# Patient Record
Sex: FEMALE | Race: BLACK OR AFRICAN AMERICAN | Employment: OTHER | URBAN - METROPOLITAN AREA
[De-identification: names, ages, dates, MRNs, and addresses within clinical notes are randomized per-mention and may not be internally consistent; named-entity substitution may affect disease eponyms.]

---

## 2018-02-13 ENCOUNTER — OP HISTORICAL/CONVERTED ENCOUNTER (OUTPATIENT)
Dept: OTHER | Age: 78
End: 2018-02-13

## 2018-06-01 ENCOUNTER — OP HISTORICAL/CONVERTED ENCOUNTER (OUTPATIENT)
Dept: OTHER | Age: 78
End: 2018-06-01

## 2019-10-01 ENCOUNTER — OP HISTORICAL/CONVERTED ENCOUNTER (OUTPATIENT)
Dept: OTHER | Age: 79
End: 2019-10-01

## 2020-12-06 ENCOUNTER — HOSPITAL ENCOUNTER (EMERGENCY)
Age: 80
Discharge: HOME | End: 2020-12-06
Attending: EMERGENCY MEDICINE
Payer: MEDICARE

## 2020-12-06 ENCOUNTER — APPOINTMENT (OUTPATIENT)
Dept: GENERAL RADIOLOGY | Age: 80
End: 2020-12-06
Attending: EMERGENCY MEDICINE
Payer: MEDICARE

## 2020-12-06 VITALS
TEMPERATURE: 97.7 F | DIASTOLIC BLOOD PRESSURE: 67 MMHG | SYSTOLIC BLOOD PRESSURE: 132 MMHG | OXYGEN SATURATION: 97 % | RESPIRATION RATE: 16 BRPM | WEIGHT: 162 LBS | BODY MASS INDEX: 24.55 KG/M2 | HEIGHT: 68 IN | HEART RATE: 67 BPM

## 2020-12-06 DIAGNOSIS — R07.89 ATYPICAL CHEST PAIN: Primary | ICD-10-CM

## 2020-12-06 LAB
ANION GAP SERPL CALC-SCNC: 8 MMOL/L (ref 5–15)
BASOPHILS # BLD: 0 K/UL (ref 0–0.1)
BASOPHILS NFR BLD: 0 % (ref 0–1)
BUN SERPL-MCNC: 17 MG/DL (ref 6–20)
BUN/CREAT SERPL: 18 (ref 12–20)
CA-I BLD-MCNC: 9.6 MG/DL (ref 8.5–10.1)
CHLORIDE SERPL-SCNC: 100 MMOL/L (ref 97–108)
CO2 SERPL-SCNC: 28 MMOL/L (ref 21–32)
CREAT SERPL-MCNC: 0.92 MG/DL (ref 0.55–1.02)
DIFFERENTIAL METHOD BLD: ABNORMAL
EOSINOPHIL # BLD: 0.1 K/UL (ref 0–0.4)
EOSINOPHIL NFR BLD: 2 % (ref 0–7)
ERYTHROCYTE [DISTWIDTH] IN BLOOD BY AUTOMATED COUNT: 13.5 % (ref 11.5–14.5)
GLUCOSE SERPL-MCNC: 152 MG/DL (ref 65–100)
HCT VFR BLD AUTO: 39.9 % (ref 35–47)
HGB BLD-MCNC: 12.9 G/DL (ref 11.5–16)
IMM GRANULOCYTES # BLD AUTO: 0 K/UL (ref 0–0.04)
IMM GRANULOCYTES NFR BLD AUTO: 0 % (ref 0–0.5)
INR PPP: 1.1 (ref 0.9–1.1)
LYMPHOCYTES # BLD: 1.5 K/UL (ref 0.8–3.5)
LYMPHOCYTES NFR BLD: 24 % (ref 12–49)
MCH RBC QN AUTO: 30.8 PG (ref 26–34)
MCHC RBC AUTO-ENTMCNC: 32.3 G/DL (ref 30–36.5)
MCV RBC AUTO: 95.2 FL (ref 80–99)
MONOCYTES # BLD: 0.4 K/UL (ref 0–1)
MONOCYTES NFR BLD: 6 % (ref 5–13)
NEUTS SEG # BLD: 4.3 K/UL (ref 1.8–8)
NEUTS SEG NFR BLD: 68 % (ref 32–75)
PLATELET # BLD AUTO: 120 K/UL (ref 150–400)
PLATELET COMMENTS,PCOM: ABNORMAL
PMV BLD AUTO: 13.8 FL (ref 8.9–12.9)
POTASSIUM SERPL-SCNC: 3.8 MMOL/L (ref 3.5–5.1)
PROTHROMBIN TIME: 11 SEC (ref 9–11.1)
RBC # BLD AUTO: 4.19 M/UL (ref 3.8–5.2)
RBC MORPH BLD: ABNORMAL
SODIUM SERPL-SCNC: 136 MMOL/L (ref 136–145)
TROPONIN I SERPL-MCNC: <0.05 NG/ML
TROPONIN I SERPL-MCNC: <0.05 NG/ML
WBC # BLD AUTO: 6.3 K/UL (ref 3.6–11)

## 2020-12-06 PROCEDURE — 36415 COLL VENOUS BLD VENIPUNCTURE: CPT

## 2020-12-06 PROCEDURE — 85610 PROTHROMBIN TIME: CPT

## 2020-12-06 PROCEDURE — 99284 EMERGENCY DEPT VISIT MOD MDM: CPT

## 2020-12-06 PROCEDURE — 84484 ASSAY OF TROPONIN QUANT: CPT

## 2020-12-06 PROCEDURE — 80048 BASIC METABOLIC PNL TOTAL CA: CPT

## 2020-12-06 PROCEDURE — 71045 X-RAY EXAM CHEST 1 VIEW: CPT

## 2020-12-06 PROCEDURE — 93005 ELECTROCARDIOGRAM TRACING: CPT

## 2020-12-06 PROCEDURE — 85025 COMPLETE CBC W/AUTO DIFF WBC: CPT

## 2020-12-06 RX ORDER — GLUCOSAMINE SULFATE 1500 MG
1000 POWDER IN PACKET (EA) ORAL DAILY
COMMUNITY

## 2020-12-06 RX ORDER — NITROGLYCERIN 0.3 MG/1
0.4 TABLET SUBLINGUAL
COMMUNITY

## 2020-12-06 RX ORDER — AMLODIPINE BESYLATE 10 MG/1
10 TABLET ORAL DAILY
COMMUNITY

## 2020-12-06 RX ORDER — ISOSORBIDE MONONITRATE 20 MG/1
60 TABLET ORAL DAILY
COMMUNITY

## 2020-12-06 RX ORDER — ASPIRIN 81 MG/1
81 TABLET ORAL DAILY
COMMUNITY

## 2020-12-06 RX ORDER — CHOLECALCIFEROL (VITAMIN D3) 125 MCG
100 CAPSULE ORAL DAILY
COMMUNITY

## 2020-12-06 RX ORDER — CARVEDILOL 25 MG/1
25 TABLET ORAL 2 TIMES DAILY WITH MEALS
COMMUNITY

## 2020-12-06 RX ORDER — ATORVASTATIN CALCIUM 20 MG/1
20 TABLET, FILM COATED ORAL DAILY
COMMUNITY

## 2020-12-06 RX ORDER — CETIRIZINE HCL 10 MG
10 TABLET ORAL DAILY
COMMUNITY

## 2020-12-07 LAB
ATRIAL RATE: 68 BPM
CALCULATED P AXIS, ECG09: 73 DEGREES
CALCULATED R AXIS, ECG10: 53 DEGREES
CALCULATED T AXIS, ECG11: 36 DEGREES
DIAGNOSIS, 93000: NORMAL
P-R INTERVAL, ECG05: 184 MS
Q-T INTERVAL, ECG07: 446 MS
QRS DURATION, ECG06: 122 MS
QTC CALCULATION (BEZET), ECG08: 474 MS
VENTRICULAR RATE, ECG03: 68 BPM

## 2020-12-07 NOTE — ED PROVIDER NOTES
EMERGENCY DEPARTMENT HISTORY AND PHYSICAL EXAM      Date: 12/6/2020  Patient Name: Elías Henson    History of Presenting Illness     Chief Complaint   Patient presents with    Chest Pain       History Provided By: Patient    HPI: Elías Henson, 79 y.o. female   presents to the ED with cc of chest pain. Patient with history of MI and a pacemaker placement presents emergency room complaining of left sided chest pain for 2 days. Patient states that pain is localized under her left breast on the lateral chest.  The pain has been intermittent lasting \"1 second \"without obvious aggravating or alleviating factors and is described as sharp and stabbing without radiation. Patient states that this pain is distinctly different from the pain she had when she had  MI many years ago. Patient denies any associated symptoms with the chest pain -no palpitation, diaphoresis, shortness of breath, or syncope. PCP: Joan Wong MD    No current facility-administered medications on file prior to encounter. Current Outpatient Medications on File Prior to Encounter   Medication Sig Dispense Refill    aspirin delayed-release 81 mg tablet Take 81 mg by mouth daily.  isosorbide mononitrate (ISMO, MONOKET) 20 mg tablet Take 60 mg by mouth daily.  amLODIPine (NORVASC) 10 mg tablet Take 10 mg by mouth daily.  atorvastatin (LIPITOR) 20 mg tablet Take 20 mg by mouth daily.  cetirizine (ZYRTEC) 10 mg tablet Take 10 mg by mouth daily.  carvediloL (COREG) 25 mg tablet Take 25 mg by mouth two (2) times daily (with meals).  NUTRITIONAL SUPPLEMENTS PO Take 1 Cap by mouth daily. Grapine (High Potency)      NUTRITIONAL SUPPLEMENTS PO Take 2 Caps by mouth daily. Food Enzymes      NUTRITIONAL SUPPLEMENTS PO Take 2 Caps by mouth daily. I-X (organic Iron)      NUTRITIONAL SUPPLEMENTS PO Take 1 Cap by mouth daily. Yeast Fungal      Lactobacillus acidophilus (PROBIOTIC PO) Take 1 Cap by mouth daily.       co-enzyme Q-10 (CoQ-10) 100 mg capsule Take 100 mg by mouth daily.  COLLAGEN 1 Cap by Does Not Apply route daily.  lactose-reduced food (NUTRITIONAL SUPPLEMENT PO) Take 1 Cap by mouth daily. Everflex      HAWTHORN BERRY PO Take 2 Caps by mouth daily.  multivitamin with minerals (VANESSA MULTIPLE/CHELATED MINERAL PO) Take 1 Cap by mouth daily.  lactose-reduced food (NUTRITIONAL SUPPLEMENT PO) Take 1 Cap by mouth daily. Herbal Trace Mineral      lactose-reduced food (NUTRITIONAL SUPPLEMENT PO) Take 1 Cap by mouth daily. Nutri Calm (stress) (adrenals)      cholecalciferol (Vitamin D3) 25 mcg (1,000 unit) cap Take 1,000 Units by mouth daily.  nitroglycerin (Nitrostat) 0.3 mg SL tablet 0.4 mg by SubLINGual route every five (5) minutes as needed for Chest Pain.  calcium carb/vitamin D2/soyb (ONE-A-DAY BONE STRENGTH PO) Take 1 Cap by mouth three (3) times daily.  COLOSTRUM, BOVINE PO Take 2 Bottles by mouth nightly.  lactose-reduced food (NUTRITIONAL SUPPLEMENT PO) Take 1 Cap by mouth daily. Panothenic Acid         Past History     Past Medical History:  Past Medical History:   Diagnosis Date    Angina at rest [facility])     Environmental and seasonal allergies     High cholesterol     History of heart attack     Hypertension     Pacemaker        Past Surgical History:  Past Surgical History:   Procedure Laterality Date    HX PACEMAKER         Family History:  History reviewed. No pertinent family history. Social History:  Social History     Tobacco Use    Smoking status: Former Smoker    Smokeless tobacco: Never Used   Substance Use Topics    Alcohol use: Not Currently    Drug use: Not Currently       Allergies: Allergies   Allergen Reactions    Augmentin [Amoxicillin-Pot Clavulanate] Swelling    Codeine Nausea Only         Review of Systems   Review of Systems   Constitutional: Negative for activity change, appetite change, chills and fever.    HENT: Negative for sore throat. Eyes: Negative for discharge. Respiratory: Negative for shortness of breath. Cardiovascular: Negative for palpitations. Gastrointestinal: Negative for abdominal pain and nausea. Endocrine: Negative for polyuria. Genitourinary: Negative for difficulty urinating. Musculoskeletal: Negative for arthralgias. Skin: Negative for rash. Neurological: Negative for headaches. Hematological: Negative for adenopathy. Psychiatric/Behavioral: Negative for dysphoric mood. All other systems reviewed and are negative. Physical Exam   Physical Exam  Vitals signs and nursing note reviewed. Constitutional:       Appearance: Normal appearance. HENT:      Head: Normocephalic and atraumatic. Nose: Nose normal.      Mouth/Throat:      Mouth: Mucous membranes are moist.      Pharynx: Oropharynx is clear. Eyes:      Conjunctiva/sclera: Conjunctivae normal.   Neck:      Musculoskeletal: Neck supple. Thyroid: No thyromegaly. Vascular: No JVD. Cardiovascular:      Rate and Rhythm: Normal rate and regular rhythm. Heart sounds: Normal heart sounds. Comments: Left lateral chest wall mildly tender. No rash. Pulmonary:      Effort: Pulmonary effort is normal.      Breath sounds: Normal breath sounds. Abdominal:      General: Abdomen is flat. Bowel sounds are normal.      Palpations: Abdomen is soft. Musculoskeletal:      Right lower leg: No edema. Left lower leg: No edema. Skin:     General: Skin is warm and dry. Neurological:      General: No focal deficit present. Mental Status: She is alert and oriented to person, place, and time.    Psychiatric:         Mood and Affect: Mood normal.         Diagnostic Study Results     Labs -     Recent Results (from the past 12 hour(s))   CBC WITH AUTOMATED DIFF    Collection Time: 12/06/20  7:15 PM   Result Value Ref Range    WBC 6.3 3.6 - 11.0 K/uL    RBC 4.19 3.80 - 5.20 M/uL    HGB 12.9 11.5 - 16.0 g/dL    HCT 39.9 35.0 - 47.0 %    MCV 95.2 80.0 - 99.0 FL    MCH 30.8 26.0 - 34.0 PG    MCHC 32.3 30.0 - 36.5 g/dL    RDW 13.5 11.5 - 14.5 %    PLATELET 508 (L) 491 - 400 K/uL    MPV 13.8 (H) 8.9 - 12.9 FL    NEUTROPHILS 68 32 - 75 %    LYMPHOCYTES 24 12 - 49 %    MONOCYTES 6 5 - 13 %    EOSINOPHILS 2 0 - 7 %    BASOPHILS 0 0 - 1 %    IMMATURE GRANULOCYTES 0 0.0 - 0.5 %    ABS. NEUTROPHILS 4.3 1.8 - 8.0 K/UL    ABS. LYMPHOCYTES 1.5 0.8 - 3.5 K/UL    ABS. MONOCYTES 0.4 0.0 - 1.0 K/UL    ABS. EOSINOPHILS 0.1 0.0 - 0.4 K/UL    ABS. BASOPHILS 0.0 0.0 - 0.1 K/UL    ABS. IMM. GRANS. 0.0 0.00 - 0.04 K/UL    DF AUTOMATED      PLATELET COMMENTS Large Platelets      RBC COMMENTS Normocytic, Normochromic     PROTHROMBIN TIME + INR    Collection Time: 12/06/20  7:15 PM   Result Value Ref Range    Prothrombin time 11.0 9.0 - 11.1 sec    INR 1.1 0.9 - 1.1     METABOLIC PANEL, BASIC    Collection Time: 12/06/20  7:15 PM   Result Value Ref Range    Sodium 136 136 - 145 mmol/L    Potassium 3.8 3.5 - 5.1 mmol/L    Chloride 100 97 - 108 mmol/L    CO2 28 21 - 32 mmol/L    Anion gap 8 5 - 15 mmol/L    Glucose 152 (H) 65 - 100 mg/dL    BUN 17 6 - 20 mg/dL    Creatinine 0.92 0.55 - 1.02 mg/dL    BUN/Creatinine ratio 18 12 - 20      GFR est AA >60 >60 ml/min/1.73m2    GFR est non-AA 59 (L) >60 ml/min/1.73m2    Calcium 9.6 8.5 - 10.1 mg/dL   TROPONIN I    Collection Time: 12/06/20  7:15 PM   Result Value Ref Range    Troponin-I, Qt. <0.05 <0.05 ng/mL   TROPONIN I    Collection Time: 12/06/20 10:12 PM   Result Value Ref Range    Troponin-I, Qt. <0.05 <0.05 ng/mL       Radiologic Studies -   XR CHEST PORT   Final Result   IMPRESSION: No acute cardiopulmonary abnormality. CT Results  (Last 48 hours)    None        CXR Results  (Last 48 hours)               12/06/20 1928  XR CHEST PORT Final result    Impression:  IMPRESSION: No acute cardiopulmonary abnormality. Narrative:  EXAMINATION: XR CHEST PORT       HISTORY: Chest pain.        COMPARISON: 3/24/2013       FINDINGS: Single view. There is been interval placement of a left subclavian   pacemaker with intact leads overlying the right atrium and right ventricle. Multiple cardiac monitoring leads overlie the patient. The lungs are clear. There is no pneumothorax or pleural effusion. Heart size and mediastinal   contours are unchanged. Medical Decision Making   I am the first provider for this patient. I reviewed the vital signs, available nursing notes, past medical history, past surgical history, family history and social history. Vital Signs-Reviewed the patient's vital signs. Patient Vitals for the past 12 hrs:   Temp Pulse Resp BP SpO2   12/06/20 2018 -- 67 18 (!) 140/71 99 %   12/06/20 1915 97.7 °F (36.5 °C) 68 18 (!) 175/81 100 %       Records Reviewed:     Provider Notes (Medical Decision Making):       ED Course:   Initial assessment performed. The patients presenting problems have been discussed, and they are in agreement with the care plan formulated and outlined with them. I have encouraged them to ask questions as they arise throughout their visit. ED Course as of Dec 06 2242   Sun Dec 06, 2020   1920 Normal sinus rhythm at 68 QRS QT normal right bundle branch block nonspecific ST-T wave changes no ectopy    [SK]      ED Course User Index  [SK] Dodie Muniz MD   Patient remains asymptomatic      PROCEDURES      Disposition: Condition stable   DC- Adult Discharges: All of the diagnostic tests were reviewed and questions answered. Diagnosis, care plan and treatment options were discussed. understand instructions and will follow up as directed. The patients results have been reviewed with them. They have been counseled regarding their diagnosis. The patient verbally convey understanding and agreement of the signs, symptoms, diagnosis, treatment and prognosis and additionally agrees to follow up as recommended.   They also agree with the care-plan and convey that all of their questions have been answered. I have also put together some discharge instructions for them that include: 1) educational information regarding their diagnosis, 2) how to care for their diagnosis at home, as well a 3) list of reasons why they would want to return to the ED prior to their follow-up appointment, should their condition change. PLAN:  1. Current Discharge Medication List        2. Follow-up Information     Follow up With Specialties Details Why Contact Info    Follow up with your primary care physician  Schedule an appointment as soon as possible for a visit in 3 days As needed         Return to ED if worse     Diagnosis     Clinical Impression:   1. Atypical chest pain        Please note that this dictation was completed with "[employer] the computer voice recognition software. Quite often unanticipated grammatical, syntax, homophones, and other interpretive errors are inadvertently transcribed by the computer software. Please disregard these errors. Please excuse any errors that have escaped final proofreading. Thank you.

## 2021-04-20 LAB — HBA1C MFR BLD HPLC: 5.8 %

## 2022-07-27 ENCOUNTER — HOSPITAL ENCOUNTER (EMERGENCY)
Age: 82
Discharge: HOME | End: 2022-07-28
Attending: EMERGENCY MEDICINE
Payer: MEDICARE

## 2022-07-27 VITALS
RESPIRATION RATE: 18 BRPM | TEMPERATURE: 98 F | HEIGHT: 67 IN | HEART RATE: 70 BPM | SYSTOLIC BLOOD PRESSURE: 158 MMHG | DIASTOLIC BLOOD PRESSURE: 72 MMHG | WEIGHT: 151 LBS | OXYGEN SATURATION: 97 % | BODY MASS INDEX: 23.7 KG/M2

## 2022-07-27 DIAGNOSIS — J01.91 ACUTE RECURRENT SINUSITIS, UNSPECIFIED LOCATION: Primary | ICD-10-CM

## 2022-07-27 PROCEDURE — 99283 EMERGENCY DEPT VISIT LOW MDM: CPT

## 2022-07-27 PROCEDURE — 74011250637 HC RX REV CODE- 250/637: Performed by: EMERGENCY MEDICINE

## 2022-07-27 RX ORDER — ACETAMINOPHEN 325 MG/1
650 TABLET ORAL ONCE
Status: COMPLETED | OUTPATIENT
Start: 2022-07-28 | End: 2022-07-27

## 2022-07-27 RX ADMIN — ACETAMINOPHEN 650 MG: 325 TABLET, FILM COATED ORAL at 23:59

## 2022-07-28 RX ORDER — LORATADINE 10 MG/1
10 TABLET ORAL DAILY
Qty: 10 TABLET | Refills: 0 | Status: SHIPPED | OUTPATIENT
Start: 2022-07-28 | End: 2022-08-07

## 2022-07-28 NOTE — ED TRIAGE NOTES
Patient presents with sinus pain that began Sunday. Patient states taking herbs ALJ. No cough. No SOB.

## 2022-07-28 NOTE — ED PROVIDER NOTES
EMERGENCY DEPARTMENT HISTORY AND PHYSICAL EXAM      Date: 7/27/2022  Patient Name: Fawad Dior    History of Presenting Illness     Chief Complaint   Patient presents with    Sinus Pain       History Provided By: Patient    HPI: Fawad Dior, 79 y.o. female with a significant past medical history of Eustachian dysfunction presents to the ED with Right sinus pain. Started 3 days ago. States it is her chronic 'Eustachian tube dysfunction'. She uses Tylenol with some relief. She denies rhinorrhea, fever, anosmia and ageusia. She is CoVID vaccinated. She denies a headache. There are no other complaints, changes, or physical findings at this time. PCP: Jurgen Baum MD    No current facility-administered medications on file prior to encounter. Current Outpatient Medications on File Prior to Encounter   Medication Sig Dispense Refill    aspirin delayed-release 81 mg tablet Take 81 mg by mouth daily. isosorbide mononitrate (ISMO, MONOKET) 20 mg tablet Take 60 mg by mouth in the morning. amLODIPine (NORVASC) 10 mg tablet Take 10 mg by mouth daily. atorvastatin (LIPITOR) 20 mg tablet Take 20 mg by mouth daily. cetirizine (ZYRTEC) 10 mg tablet Take 10 mg by mouth daily. carvediloL (COREG) 25 mg tablet Take 25 mg by mouth two (2) times daily (with meals). co-enzyme Q-10 (CO Q-10) 100 mg capsule Take 100 mg by mouth daily. NUTRITIONAL SUPPLEMENTS PO Take 1 Cap by mouth daily. Grapine (High Potency)      NUTRITIONAL SUPPLEMENTS PO Take 2 Caps by mouth daily. Food Enzymes      NUTRITIONAL SUPPLEMENTS PO Take 2 Caps by mouth daily. I-X (organic Iron)      NUTRITIONAL SUPPLEMENTS PO Take 1 Cap by mouth daily. Yeast Fungal      Lactobacillus acidophilus (PROBIOTIC PO) Take 1 Cap by mouth daily. COLLAGEN 1 Cap by Does Not Apply route daily. lactose-reduced food (NUTRITIONAL SUPPLEMENT PO) Take 1 Cap by mouth daily.  Everflex      HAWTHORN BERRY PO Take 2 Caps by mouth daily.      multivitamin with minerals (VANESSA MULTIPLE/CHELATED MINERAL PO) Take 1 Cap by mouth daily. lactose-reduced food (NUTRITIONAL SUPPLEMENT PO) Take 1 Cap by mouth daily. Herbal Trace Mineral      lactose-reduced food (NUTRITIONAL SUPPLEMENT PO) Take 1 Cap by mouth daily. Nutri Calm (stress) (adrenals)      cholecalciferol (Vitamin D3) 25 mcg (1,000 unit) cap Take 1,000 Units by mouth daily. nitroglycerin (Nitrostat) 0.3 mg SL tablet 0.4 mg by SubLINGual route every five (5) minutes as needed for Chest Pain. calcium carb/vitamin D2/soyb (ONE-A-DAY BONE STRENGTH PO) Take 1 Cap by mouth three (3) times daily. COLOSTRUM, BOVINE PO Take 2 Bottles by mouth nightly. lactose-reduced food (NUTRITIONAL SUPPLEMENT PO) Take 1 Cap by mouth daily. Panothenic Acid         Past History     Past Medical History:  Past Medical History:   Diagnosis Date    Angina at rest [facility])     Environmental and seasonal allergies     High cholesterol     History of heart attack     Hypertension     Pacemaker        Past Surgical History:  Past Surgical History:   Procedure Laterality Date    HX PACEMAKER         Family History:  History reviewed. No pertinent family history. Social History:  Social History     Tobacco Use    Smoking status: Former    Smokeless tobacco: Never   Substance Use Topics    Alcohol use: Not Currently    Drug use: Not Currently       Allergies: Allergies   Allergen Reactions    Augmentin [Amoxicillin-Pot Clavulanate] Swelling    Codeine Nausea Only       Review of Systems   Review of Systems   Constitutional: Negative. HENT:  Positive for congestion, sinus pressure and sinus pain. Respiratory: Negative. Cardiovascular: Negative. Gastrointestinal: Negative. Genitourinary: Negative. Neurological: Negative. All other systems reviewed and are negative. Physical Exam   Physical Exam  Vitals and nursing note reviewed.    Constitutional:       Appearance: Normal appearance. HENT:      Head: Normocephalic and atraumatic. Right Ear: Tympanic membrane normal.      Nose: Nose normal.      Mouth/Throat:      Mouth: Mucous membranes are moist.   Eyes:      Extraocular Movements: Extraocular movements intact. Pupils: Pupils are equal, round, and reactive to light. Cardiovascular:      Rate and Rhythm: Normal rate and regular rhythm. Pulses: Normal pulses. Heart sounds: Normal heart sounds. Pulmonary:      Effort: Pulmonary effort is normal.      Breath sounds: Normal breath sounds. Musculoskeletal:      Cervical back: Normal range of motion and neck supple. Neurological:      General: No focal deficit present. Mental Status: She is alert and oriented to person, place, and time. Lab and Diagnostic Study Results   Labs -   No results found for this or any previous visit (from the past 12 hour(s)). Radiologic Studies -   @lastxrresult@  CT Results  (Last 48 hours)      None          CXR Results  (Last 48 hours)      None            Medical Decision Making and ED Course   - I am the first provider for this patient. I reviewed the vital signs, available nursing notes, past medical history, past surgical history, family history and social history. - Initial assessment performed. The patients presenting problems have been discussed, and they are in agreement with the care plan formulated and outlined with them. I have encouraged them to ask questions as they arise throughout their visit. Vital Signs-Reviewed the patient's vital signs. Patient Vitals for the past 12 hrs:   Temp Pulse Resp BP SpO2   07/27/22 2313 98 °F (36.7 °C) 70 18 (!) 158/72 97 %       Differential Diagnosis & Medical Decision Making Provider Note:     [facility]       ED Course:        Procedures   Performed by: Bebeto Buckner MD  Procedures      Disposition   Disposition: Condition improved  DC- Adult Discharges:  All of the diagnostic tests were reviewed and questions answered. Diagnosis, care plan and treatment options were discussed. The patient understands the instructions and will follow up as directed. The patients results have been reviewed with them. They have been counseled regarding their diagnosis. The patient verbally convey understanding and agreement of the signs, symptoms, diagnosis, treatment and prognosis and additionally agrees to follow up as recommended with their PCP in 24 - 48 hours. They also agree with the care-plan and convey that all of their questions have been answered. I have also put together some discharge instructions for them that include: 1) educational information regarding their diagnosis, 2) how to care for their diagnosis at home, as well a 3) list of reasons why they would want to return to the ED prior to their follow-up appointment, should their condition change. Discharged    DISCHARGE PLAN:  1. Current Discharge Medication List        CONTINUE these medications which have NOT CHANGED    Details   aspirin delayed-release 81 mg tablet Take 81 mg by mouth daily. isosorbide mononitrate (ISMO, MONOKET) 20 mg tablet Take 60 mg by mouth in the morning. amLODIPine (NORVASC) 10 mg tablet Take 10 mg by mouth daily. atorvastatin (LIPITOR) 20 mg tablet Take 20 mg by mouth daily. cetirizine (ZYRTEC) 10 mg tablet Take 10 mg by mouth daily. carvediloL (COREG) 25 mg tablet Take 25 mg by mouth two (2) times daily (with meals). co-enzyme Q-10 (CO Q-10) 100 mg capsule Take 100 mg by mouth daily. !! NUTRITIONAL SUPPLEMENTS PO Take 1 Cap by mouth daily. Grapine (High Potency)      !! NUTRITIONAL SUPPLEMENTS PO Take 2 Caps by mouth daily. Food Enzymes      !! NUTRITIONAL SUPPLEMENTS PO Take 2 Caps by mouth daily. I-X (organic Iron)      !! NUTRITIONAL SUPPLEMENTS PO Take 1 Cap by mouth daily. Yeast Fungal      Lactobacillus acidophilus (PROBIOTIC PO) Take 1 Cap by mouth daily.       COLLAGEN 1 Cap by Does Not Apply route daily. !! lactose-reduced food (NUTRITIONAL SUPPLEMENT PO) Take 1 Cap by mouth daily. Everflex      HAWTHORN BERRY PO Take 2 Caps by mouth daily. multivitamin with minerals (VANESSA MULTIPLE/CHELATED MINERAL PO) Take 1 Cap by mouth daily. !! lactose-reduced food (NUTRITIONAL SUPPLEMENT PO) Take 1 Cap by mouth daily. Herbal Trace Mineral      !! lactose-reduced food (NUTRITIONAL SUPPLEMENT PO) Take 1 Cap by mouth daily. Nutri Calm (stress) (adrenals)      cholecalciferol (Vitamin D3) 25 mcg (1,000 unit) cap Take 1,000 Units by mouth daily. nitroglycerin (Nitrostat) 0.3 mg SL tablet 0.4 mg by SubLINGual route every five (5) minutes as needed for Chest Pain. calcium carb/vitamin D2/soyb (ONE-A-DAY BONE STRENGTH PO) Take 1 Cap by mouth three (3) times daily. COLOSTRUM, BOVINE PO Take 2 Bottles by mouth nightly. !! lactose-reduced food (NUTRITIONAL SUPPLEMENT PO) Take 1 Cap by mouth daily. Panothenic Acid       !! - Potential duplicate medications found. Please discuss with provider. 2.   Follow-up Information       Follow up With Specialties Details Why Contact Info    Ashley Araya MD Internal Medicine Physician In 2 days  [address]  [phone]            3. Return to ED if worse   4. Current Discharge Medication List        START taking these medications    Details   loratadine (Claritin) 10 mg tablet Take 1 Tablet by mouth in the morning for 10 days. Qty: 10 Tablet, Refills: 0  Start date: 7/28/2022, End date: 8/7/2022            Remove if admitted/transferred    Diagnosis/Clinical Impression     Clinical Impression:   1. Acute recurrent sinusitis, unspecified location        Attestations: Alfred SHARP MD, am the primary clinician of record. Please note that this dictation was completed with [employer], the "[employer] voice recognition software.   Quite often unanticipated grammatical, syntax, homophones, and other interpretive errors are inadvertently transcribed by the computer software. Please disregard these errors. Please excuse any errors that have escaped final proofreading. Thank you.

## 2022-08-11 ENCOUNTER — APPOINTMENT (OUTPATIENT)
Dept: CT IMAGING | Age: 82
End: 2022-08-11
Attending: NURSE PRACTITIONER
Payer: MEDICARE

## 2022-08-11 ENCOUNTER — HOSPITAL ENCOUNTER (EMERGENCY)
Age: 82
Discharge: HOME | End: 2022-08-11
Admitting: FAMILY MEDICINE
Payer: MEDICARE

## 2022-08-11 VITALS
TEMPERATURE: 98.3 F | OXYGEN SATURATION: 99 % | BODY MASS INDEX: 23.54 KG/M2 | HEART RATE: 60 BPM | RESPIRATION RATE: 16 BRPM | SYSTOLIC BLOOD PRESSURE: 167 MMHG | WEIGHT: 150 LBS | DIASTOLIC BLOOD PRESSURE: 81 MMHG | HEIGHT: 67 IN

## 2022-08-11 DIAGNOSIS — I10 HYPERTENSION, UNSPECIFIED TYPE: Primary | ICD-10-CM

## 2022-08-11 DIAGNOSIS — R42 DIZZINESS: ICD-10-CM

## 2022-08-11 LAB
ALBUMIN SERPL-MCNC: 4.1 G/DL (ref 3.5–5)
ALBUMIN/GLOB SERPL: 1.1 {RATIO} (ref 1.1–2.2)
ALP SERPL-CCNC: 63 U/L (ref 45–117)
ALT SERPL-CCNC: 55 U/L (ref 12–78)
ANION GAP SERPL CALC-SCNC: 7 MMOL/L (ref 5–15)
APPEARANCE UR: CLEAR
AST SERPL W P-5'-P-CCNC: 37 U/L (ref 15–37)
ATRIAL RATE: 64 BPM
BACTERIA URNS QL MICRO: NEGATIVE /HPF
BASOPHILS # BLD: 0 K/UL (ref 0–0.1)
BASOPHILS NFR BLD: 0 % (ref 0–1)
BILIRUB SERPL-MCNC: 1.1 MG/DL (ref 0.2–1)
BILIRUB UR QL: NEGATIVE
BUN SERPL-MCNC: 15 MG/DL (ref 6–20)
BUN/CREAT SERPL: 18 (ref 12–20)
CA-I BLD-MCNC: 9.3 MG/DL (ref 8.5–10.1)
CALCULATED P AXIS, ECG09: 69 DEGREES
CALCULATED R AXIS, ECG10: 23 DEGREES
CALCULATED T AXIS, ECG11: 17 DEGREES
CHLORIDE SERPL-SCNC: 102 MMOL/L (ref 97–108)
CO2 SERPL-SCNC: 33 MMOL/L (ref 21–32)
COLOR UR: YELLOW
CREAT SERPL-MCNC: 0.85 MG/DL (ref 0.55–1.02)
DIAGNOSIS, 93000: NORMAL
DIFFERENTIAL METHOD BLD: ABNORMAL
EOSINOPHIL # BLD: 0.1 K/UL (ref 0–0.4)
EOSINOPHIL NFR BLD: 1 % (ref 0–7)
EPITH CASTS URNS QL MICRO: ABNORMAL /LPF
ERYTHROCYTE [DISTWIDTH] IN BLOOD BY AUTOMATED COUNT: 13.9 % (ref 11.5–14.5)
GLOBULIN SER CALC-MCNC: 3.7 G/DL (ref 2–4)
GLUCOSE SERPL-MCNC: 125 MG/DL (ref 65–100)
GLUCOSE UR STRIP.AUTO-MCNC: NEGATIVE MG/DL
HCT VFR BLD AUTO: 42.2 % (ref 35–47)
HGB BLD-MCNC: 13.6 G/DL (ref 11.5–16)
HGB UR QL STRIP: NEGATIVE
IMM GRANULOCYTES # BLD AUTO: 0 K/UL (ref 0–0.04)
IMM GRANULOCYTES NFR BLD AUTO: 0 % (ref 0–0.5)
KETONES UR QL STRIP.AUTO: NEGATIVE MG/DL
LEUKOCYTE ESTERASE UR QL STRIP.AUTO: NEGATIVE
LYMPHOCYTES # BLD: 1.4 K/UL (ref 0.8–3.5)
LYMPHOCYTES NFR BLD: 20 % (ref 12–49)
MCH RBC QN AUTO: 31.1 PG (ref 26–34)
MCHC RBC AUTO-ENTMCNC: 32.2 G/DL (ref 30–36.5)
MCV RBC AUTO: 96.6 FL (ref 80–99)
MONOCYTES # BLD: 0.5 K/UL (ref 0–1)
MONOCYTES NFR BLD: 7 % (ref 5–13)
NEUTS SEG # BLD: 5 K/UL (ref 1.8–8)
NEUTS SEG NFR BLD: 72 % (ref 32–75)
NITRITE UR QL STRIP.AUTO: NEGATIVE
P-R INTERVAL, ECG05: 150 MS
PH UR STRIP: 8 [PH] (ref 5–8)
PLATELET # BLD AUTO: 127 K/UL (ref 150–400)
POTASSIUM SERPL-SCNC: 3.5 MMOL/L (ref 3.5–5.1)
PROT SERPL-MCNC: 7.8 G/DL (ref 6.4–8.2)
PROT UR STRIP-MCNC: NEGATIVE MG/DL
Q-T INTERVAL, ECG07: 446 MS
QRS DURATION, ECG06: 126 MS
QTC CALCULATION (BEZET), ECG08: 460 MS
RBC # BLD AUTO: 4.37 M/UL (ref 3.8–5.2)
RBC #/AREA URNS HPF: ABNORMAL /HPF (ref 0–5)
SODIUM SERPL-SCNC: 142 MMOL/L (ref 136–145)
SP GR UR REFRACTOMETRY: 1.01 (ref 1–1.03)
TROPONIN-HIGH SENSITIVITY: 27 NG/L (ref 0–51)
UA: UC IF INDICATED,UAUC: ABNORMAL
UROBILINOGEN UR QL STRIP.AUTO: 0.1 EU/DL (ref 0.2–1)
VENTRICULAR RATE, ECG03: 64 BPM
WBC # BLD AUTO: 7 K/UL (ref 3.6–11)
WBC URNS QL MICRO: ABNORMAL /HPF (ref 0–4)

## 2022-08-11 PROCEDURE — 96374 THER/PROPH/DIAG INJ IV PUSH: CPT | Performed by: FAMILY MEDICINE

## 2022-08-11 PROCEDURE — 85025 COMPLETE CBC W/AUTO DIFF WBC: CPT

## 2022-08-11 PROCEDURE — 74011250636 HC RX REV CODE- 250/636: Performed by: NURSE PRACTITIONER

## 2022-08-11 PROCEDURE — 36415 COLL VENOUS BLD VENIPUNCTURE: CPT

## 2022-08-11 PROCEDURE — 70450 CT HEAD/BRAIN W/O DYE: CPT

## 2022-08-11 PROCEDURE — 93005 ELECTROCARDIOGRAM TRACING: CPT

## 2022-08-11 PROCEDURE — 84484 ASSAY OF TROPONIN QUANT: CPT

## 2022-08-11 PROCEDURE — 80053 COMPREHEN METABOLIC PANEL: CPT

## 2022-08-11 PROCEDURE — 99284 EMERGENCY DEPT VISIT MOD MDM: CPT | Performed by: FAMILY MEDICINE

## 2022-08-11 PROCEDURE — 81001 URINALYSIS AUTO W/SCOPE: CPT

## 2022-08-11 RX ORDER — ONDANSETRON 4 MG/1
4 TABLET, ORALLY DISINTEGRATING ORAL
Status: DISCONTINUED | OUTPATIENT
Start: 2022-08-11 | End: 2022-08-11 | Stop reason: SDUPTHER

## 2022-08-11 RX ORDER — MECLIZINE HYDROCHLORIDE 25 MG/1
25 TABLET ORAL
Qty: 20 TABLET | Refills: 0 | Status: SHIPPED | OUTPATIENT
Start: 2022-08-11

## 2022-08-11 RX ORDER — ONDANSETRON 2 MG/ML
4 INJECTION INTRAMUSCULAR; INTRAVENOUS
Status: COMPLETED | OUTPATIENT
Start: 2022-08-11 | End: 2022-08-11

## 2022-08-11 RX ADMIN — ONDANSETRON 4 MG: 2 INJECTION INTRAMUSCULAR; INTRAVENOUS at 16:38

## 2022-08-11 NOTE — ED TRIAGE NOTES
Pt reports that she has been battling a sinus infection and forgot to take her BP meds, had some dizziness and noted that her BP was elevated, she took her meds, but her BP continues to be high.

## 2022-08-11 NOTE — ED PROVIDER NOTES
EMERGENCY DEPARTMENT HISTORY AND PHYSICAL EXAM      Date: 8/11/2022  Patient Name: June Sanches      History of Presenting Illness     Chief Complaint   Patient presents with    Hypertension       History Provided By: Patient    HPI: June Sanches, 79 y.o. female with a past medical history significant  MI, pacemaker, hypertension, hyperlipidemia, angina  presents to the ED with cc of evaded blood pressure. She reports initially forgetting to take her blood pressure this morning however since she took medication it has not improved. She reports normal range in the 120s to 130s decided to come in. She reports recently being treated for sinus infection with complaints of lightheadedness denies taking medication as prescribed. Denies any chest pain, shortness of breath, fever, chills, nausea, vomiting, headache, numbness, tingling, or weakness. There are no other complaints, changes, or physical findings at this time. PCP: Karlos Vann MD    Current Outpatient Medications   Medication Sig Dispense Refill    meclizine (ANTIVERT) 25 mg tablet Take 1 Tablet by mouth three (3) times daily as needed for Dizziness. 20 Tablet 0    aspirin delayed-release 81 mg tablet Take 81 mg by mouth daily. isosorbide mononitrate (ISMO, MONOKET) 20 mg tablet Take 60 mg by mouth in the morning. amLODIPine (NORVASC) 10 mg tablet Take 10 mg by mouth daily. atorvastatin (LIPITOR) 20 mg tablet Take 20 mg by mouth daily. cetirizine (ZYRTEC) 10 mg tablet Take 10 mg by mouth daily. carvediloL (COREG) 25 mg tablet Take 25 mg by mouth two (2) times daily (with meals). NUTRITIONAL SUPPLEMENTS PO Take 1 Cap by mouth daily. Grapine (High Potency)      NUTRITIONAL SUPPLEMENTS PO Take 2 Caps by mouth daily. Food Enzymes      NUTRITIONAL SUPPLEMENTS PO Take 2 Caps by mouth daily. I-X (organic Iron)      NUTRITIONAL SUPPLEMENTS PO Take 1 Cap by mouth daily.  Yeast Fungal      Lactobacillus acidophilus (PROBIOTIC PO) Take 1 Cap by mouth daily. co-enzyme Q-10 (CO Q-10) 100 mg capsule Take 100 mg by mouth daily. COLLAGEN 1 Cap by Does Not Apply route daily. lactose-reduced food (NUTRITIONAL SUPPLEMENT PO) Take 1 Cap by mouth daily. Everflex      HAWTHORN BERRY PO Take 2 Caps by mouth daily. multivitamin with minerals (VANESSA MULTIPLE/CHELATED MINERAL PO) Take 1 Cap by mouth daily. lactose-reduced food (NUTRITIONAL SUPPLEMENT PO) Take 1 Cap by mouth daily. Herbal Trace Mineral      lactose-reduced food (NUTRITIONAL SUPPLEMENT PO) Take 1 Cap by mouth daily. Nutri Calm (stress) (adrenals)      cholecalciferol (Vitamin D3) 25 mcg (1,000 unit) cap Take 1,000 Units by mouth daily. nitroglycerin (Nitrostat) 0.3 mg SL tablet 0.4 mg by SubLINGual route every five (5) minutes as needed for Chest Pain. calcium carb/vitamin D2/soyb (ONE-A-DAY BONE STRENGTH PO) Take 1 Cap by mouth three (3) times daily. COLOSTRUM, BOVINE PO Take 2 Bottles by mouth nightly. lactose-reduced food (NUTRITIONAL SUPPLEMENT PO) Take 1 Cap by mouth daily. Panothenic Acid         Past History   Past Medical History:  Past Medical History:   Diagnosis Date    Angina at rest [facility])     Environmental and seasonal allergies     High cholesterol     History of heart attack     Hypertension     Pacemaker        Past Surgical History:  Past Surgical History:   Procedure Laterality Date    HX PACEMAKER         Family History:  History reviewed. No pertinent family history. Social History:  Social History     Tobacco Use    Smoking status: Former    Smokeless tobacco: Never   Substance Use Topics    Alcohol use: Not Currently    Drug use: Not Currently       Allergies: Allergies   Allergen Reactions    Augmentin [Amoxicillin-Pot Clavulanate] Swelling    Codeine Nausea Only     Review of Systems   Review of Systems   Constitutional:  Negative for chills and fever. HENT:  Negative for congestion, sinus pressure and sinus pain. Respiratory:  Negative for cough and shortness of breath. Cardiovascular:  Negative for chest pain and leg swelling. Gastrointestinal:  Negative for abdominal pain, nausea and vomiting. Genitourinary:  Negative for dysuria, frequency and urgency. Musculoskeletal:  Negative for arthralgias and myalgias. Skin: Negative. Neurological:  Positive for light-headedness. Negative for dizziness, weakness, numbness and headaches. Psychiatric/Behavioral: Negative. All other systems reviewed and are negative. Physical Exam   Physical Exam  Vitals and nursing note reviewed. Constitutional:       General: She is not in acute distress. Appearance: Normal appearance. She is normal weight. She is not ill-appearing or toxic-appearing. HENT:      Head: Normocephalic and atraumatic. Right Ear: Hearing normal.      Left Ear: Hearing normal.      Nose: Nose normal.      Mouth/Throat:      Mouth: Mucous membranes are moist.   Eyes:      General: Lids are normal.      Extraocular Movements: Extraocular movements intact. Pupils: Pupils are equal, round, and reactive to light. Cardiovascular:      Rate and Rhythm: Normal rate and regular rhythm. Pulses: Normal pulses. Radial pulses are 2+ on the right side and 2+ on the left side. Dorsalis pedis pulses are 2+ on the right side and 2+ on the left side. Pulmonary:      Effort: Pulmonary effort is normal. No accessory muscle usage or respiratory distress. Breath sounds: Normal breath sounds. No wheezing or rhonchi. Abdominal:      General: Bowel sounds are normal.      Palpations: Abdomen is soft. Tenderness: There is no abdominal tenderness. There is no right CVA tenderness or left CVA tenderness. Musculoskeletal:      Cervical back: Normal range of motion and neck supple. No muscular tenderness. Right lower leg: No edema. Left lower leg: No edema.    Feet:      Right foot:      Skin integrity: No skin breakdown. Left foot:      Skin integrity: No skin breakdown. Skin:     General: Skin is warm and dry. Capillary Refill: Capillary refill takes less than 2 seconds. Findings: No abrasion, bruising, ecchymosis, erythema or signs of injury. Neurological:      Mental Status: She is alert and oriented to person, place, and time. GCS: GCS eye subscore is 4. GCS verbal subscore is 5. GCS motor subscore is 6. Cranial Nerves: Cranial nerves are intact. Sensory: Sensation is intact. Psychiatric:         Attention and Perception: Attention normal.         Mood and Affect: Mood normal.         Behavior: Behavior normal. Behavior is cooperative.          Cognition and Memory: Cognition normal.       Lab and Diagnostic Study Results   Labs -     Recent Results (from the past 12 hour(s))   URINALYSIS W/ REFLEX CULTURE    Collection Time: 08/11/22  4:00 PM    Specimen: Urine   Result Value Ref Range    Color Yellow      Appearance Clear Clear      Specific gravity 1.010 1.003 - 1.030      pH (UA) 8.0 5.0 - 8.0      Protein Negative Negative mg/dL    Glucose Negative Negative mg/dL    Ketone Negative Negative mg/dL    Bilirubin Negative Negative      Blood Negative Negative      Urobilinogen 0.1 (L) 0.2 - 1.0 EU/dL    Nitrites Negative Negative      Leukocyte Esterase Negative Negative      WBC 0-4 0 - 4 /hpf    RBC 0-5 0 - 5 /hpf    Epithelial cells Few Few /lpf    Bacteria Negative Negative /hpf    UA:UC IF INDICATED Culture not indicated by UA result Culture not indicated by UA result     EKG, 12 LEAD, INITIAL    Collection Time: 08/11/22  4:02 PM   Result Value Ref Range    Ventricular Rate 64 BPM    Atrial Rate 64 BPM    P-R Interval 150 ms    QRS Duration 126 ms    Q-T Interval 446 ms    QTC Calculation (Bezet) 460 ms    Calculated P Axis 69 degrees    Calculated R Axis 23 degrees    Calculated T Axis 17 degrees    Diagnosis       Atrial-paced rhythm  Right bundle branch block  Abnormal ECG  When compared with ECG of 06-DEC-2020 19:19,  Electronic atrial pacemaker has replaced Sinus rhythm  Confirmed by DAVE MD, Rosalina Cheadle ([id number]) on 8/11/2022 6:45:79 PM     METABOLIC PANEL, COMPREHENSIVE    Collection Time: 08/11/22  4:30 PM   Result Value Ref Range    Sodium 142 136 - 145 mmol/L    Potassium 3.5 3.5 - 5.1 mmol/L    Chloride 102 97 - 108 mmol/L    CO2 33 (H) 21 - 32 mmol/L    Anion gap 7 5 - 15 mmol/L    Glucose 125 (H) 65 - 100 mg/dL    BUN 15 6 - 20 mg/dL    Creatinine 0.85 0.55 - 1.02 mg/dL    BUN/Creatinine ratio 18 12 - 20      GFR est AA >60 >60 ml/min/1.73m2    GFR est non-AA >60 >60 ml/min/1.73m2    Calcium 9.3 8.5 - 10.1 mg/dL    Bilirubin, total 1.1 (H) 0.2 - 1.0 mg/dL    AST (SGOT) 37 15 - 37 U/L    ALT (SGPT) 55 12 - 78 U/L    Alk. phosphatase 63 45 - 117 U/L    Protein, total 7.8 6.4 - 8.2 g/dL    Albumin 4.1 3.5 - 5.0 g/dL    Globulin 3.7 2.0 - 4.0 g/dL    A-G Ratio 1.1 1.1 - 2.2     CBC WITH AUTOMATED DIFF    Collection Time: 08/11/22  4:30 PM   Result Value Ref Range    WBC 7.0 3.6 - 11.0 K/uL    RBC 4.37 3.80 - 5.20 M/uL    HGB 13.6 11.5 - 16.0 g/dL    HCT 42.2 35.0 - 47.0 %    MCV 96.6 80.0 - 99.0 FL    MCH 31.1 26.0 - 34.0 PG    MCHC 32.2 30.0 - 36.5 g/dL    RDW 13.9 11.5 - 14.5 %    PLATELET 443 (L) 035 - 400 K/uL    NEUTROPHILS 72 32 - 75 %    LYMPHOCYTES 20 12 - 49 %    MONOCYTES 7 5 - 13 %    EOSINOPHILS 1 0 - 7 %    BASOPHILS 0 0 - 1 %    IMMATURE GRANULOCYTES 0 0.0 - 0.5 %    ABS. NEUTROPHILS 5.0 1.8 - 8.0 K/UL    ABS. LYMPHOCYTES 1.4 0.8 - 3.5 K/UL    ABS. MONOCYTES 0.5 0.0 - 1.0 K/UL    ABS. EOSINOPHILS 0.1 0.0 - 0.4 K/UL    ABS. BASOPHILS 0.0 0.0 - 0.1 K/UL    ABS. IMM.  GRANS. 0.0 0.00 - 0.04 K/UL    DF AUTOMATED     TROPONIN-HIGH SENSITIVITY    Collection Time: 08/11/22  4:30 PM   Result Value Ref Range    Troponin-High Sensitivity 27 0 - 51 ng/L       Radiologic Studies -   [unfilled]  CT Results  (Last 48 hours)                 08/11/22 1615  CT HEAD WO CONT Final result    Impression:  No acute intracranial process. Imaging findings consistent with minimal chronic microvascular ischemic change. There is a mild to moderate degree of cerebral atrophy. Narrative:  CLINICAL HISTORY: htn, dizzy   INDICATION: htn, dizzy   COMPARISON: None. CT dose reduction was achieved through use of a standardized protocol tailored   for this examination and automatic exposure control for dose modulation. TECHNIQUE: Serial axial images with a collimation of 5 mm were obtained from the   skull base through the vertex     FINDINGS:    There is sulcal and ventricular prominence. Confluent periventricular and   scattered foci of hypodensity in the cerebral white matter. There is no evidence   of an acute infarction, hemorrhage, or mass-effect. There is no evidence of   midline shift or hydrocephalus. Posterior fossa structures are unremarkable. No   extra-axial collections are seen. Mastoid air cells are well pneumatized and clear. There is no evidence of depressed skull fractures of soft tissue swelling. CXR Results  (Last 48 hours)      None            Medical Decision Making and ED Course   - I am the first and primary provider for this patient AND AM THE PRIMARY PROVIDER OF RECORD. I reviewed the vital signs, available nursing notes, past medical history, past surgical history, family history and social history. - Initial assessment performed. The patients presenting problems have been discussed, and the staff are in agreement with the care plan formulated and outlined with them. I have encouraged them to ask questions as they arise throughout their visit. Differential Diagnosis & Medical Decision Making Provider Note: CVA, hypertension urgency, hypertensive emergency, compliant medication    Afebrile not tachycardic SPO2 91% on room air. EKG shows paced rhythm with right bundle branch block. Patient denies any active chest pain.   Blood pressure noted to be elevated on exam however since improved over course of monitoring. CTA head grossly negative patient basic labs within normal limits negative Trope. Exam within normal limits. Patient advised of take medication as prescribed follow-up with PCP in the next 2 days for reevaluation of blood pressure. She reports recent follow-up with cardiologist with negative stress test.  The signs and symptoms to return to emergency department. Stable at time of discharge    Vital Signs-Reviewed the patient's vital signs. Patient Vitals for the past 12 hrs:   Temp Pulse Resp BP SpO2   08/11/22 1547 98.3 °F (36.8 °C) 75 18 (!) 209/97 100 %       EKG interpretation: (Preliminary): Performed at 1602. EKG Interpreted by me. Shows A paced right bundle branch @ 64    ED Course:          Procedures and Critical Care     P  NOTES   :  I have spent the above critical care time involved in lab review, consultations with specialist, family decision- making, bedside attention and documentation. This time excludes time spent in any separate billed procedures. During this entire length of time I was immediately available to the patient . Keyur Huynh NP    Disposition   Disposition: DC- Adult Discharges: All of the diagnostic tests were reviewed and questions answered. Diagnosis, care plan and treatment options were discussed. The patient understands the instructions and will follow up as directed. The patients results have been reviewed with them. They have been counseled regarding their diagnosis. The patient verbally convey understanding and agreement of the signs, symptoms, diagnosis, treatment and prognosis and additionally agrees to follow up as recommended with their PCP in 24 - 48 hours. They also agree with the care-plan and convey that all of their questions have been answered.   I have also put together some discharge instructions for them that include: 1) educational information regarding their diagnosis, 2) how to care for their diagnosis at home, as well a 3) list of reasons why they would want to return to the ED prior to their follow-up appointment, should their condition change. Discharged    DISCHARGE PLAN:  1. Current Discharge Medication List        START taking these medications    Details   meclizine (ANTIVERT) 25 mg tablet Take 1 Tablet by mouth three (3) times daily as needed for Dizziness. Qty: 20 Tablet, Refills: 0           CONTINUE these medications which have NOT CHANGED    Details   aspirin delayed-release 81 mg tablet Take 81 mg by mouth daily. isosorbide mononitrate (ISMO, MONOKET) 20 mg tablet Take 60 mg by mouth in the morning. amLODIPine (NORVASC) 10 mg tablet Take 10 mg by mouth daily. atorvastatin (LIPITOR) 20 mg tablet Take 20 mg by mouth daily. cetirizine (ZYRTEC) 10 mg tablet Take 10 mg by mouth daily. carvediloL (COREG) 25 mg tablet Take 25 mg by mouth two (2) times daily (with meals). !! NUTRITIONAL SUPPLEMENTS PO Take 1 Cap by mouth daily. Grapine (High Potency)      !! NUTRITIONAL SUPPLEMENTS PO Take 2 Caps by mouth daily. Food Enzymes      !! NUTRITIONAL SUPPLEMENTS PO Take 2 Caps by mouth daily. I-X (organic Iron)      !! NUTRITIONAL SUPPLEMENTS PO Take 1 Cap by mouth daily. Yeast Fungal      Lactobacillus acidophilus (PROBIOTIC PO) Take 1 Cap by mouth daily. co-enzyme Q-10 (CO Q-10) 100 mg capsule Take 100 mg by mouth daily. COLLAGEN 1 Cap by Does Not Apply route daily. !! lactose-reduced food (NUTRITIONAL SUPPLEMENT PO) Take 1 Cap by mouth daily. Everflex      HAWTHORN BERRY PO Take 2 Caps by mouth daily. multivitamin with minerals (VANESSA MULTIPLE/CHELATED MINERAL PO) Take 1 Cap by mouth daily. !! lactose-reduced food (NUTRITIONAL SUPPLEMENT PO) Take 1 Cap by mouth daily. Herbal Trace Mineral      !! lactose-reduced food (NUTRITIONAL SUPPLEMENT PO) Take 1 Cap by mouth daily.  Nutri Calm (stress) (adrenals) cholecalciferol (Vitamin D3) 25 mcg (1,000 unit) cap Take 1,000 Units by mouth daily. nitroglycerin (Nitrostat) 0.3 mg SL tablet 0.4 mg by SubLINGual route every five (5) minutes as needed for Chest Pain. calcium carb/vitamin D2/soyb (ONE-A-DAY BONE STRENGTH PO) Take 1 Cap by mouth three (3) times daily. COLOSTRUM, BOVINE PO Take 2 Bottles by mouth nightly. !! lactose-reduced food (NUTRITIONAL SUPPLEMENT PO) Take 1 Cap by mouth daily. Panothenic Acid       !! - Potential duplicate medications found. Please discuss with provider. 2.   Follow-up Information       Follow up With Specialties Details Why Contact Info    Elie Oconnor MD Internal Medicine Physician Schedule an appointment as soon as possible for a visit in 2 days blood pressure re-evaluation [address] [place] [address]  [phone]            3. Return to ED if worse   4. Current Discharge Medication List        START taking these medications    Details   meclizine (ANTIVERT) 25 mg tablet Take 1 Tablet by mouth three (3) times daily as needed for Dizziness. Qty: 20 Tablet, Refills: 0  Start date: 8/11/2022               Diagnosis/Clinical Impression     Clinical Impression:   1. Hypertension, unspecified type    2. Dizziness        Attestations: Diana Gomes NP, am the primary clinician of record. Please note that this dictation was completed with [employer], the [employer] voice recognition software. Quite often unanticipated grammatical, syntax, homophones, and other interpretive errors are inadvertently transcribed by the computer software. Please disregard these errors. Please excuse any errors that have escaped final proofreading. Thank you.

## 2022-08-11 NOTE — DISCHARGE INSTRUCTIONS
Thank you! Thank you for allowing me to care for you in the emergency department. It is my goal to provide you with excellent care. If you have not received excellent quality care, please ask to speak to the nurse manager. Please fill out the survey that will come to you by mail or email since we listen to your feedback! Below you will find a list of your tests from today's visit. Should you have any questions, please do not hesitate to call the emergency department.     Labs  Recent Results (from the past 12 hour(s))   URINALYSIS W/ REFLEX CULTURE    Collection Time: 08/11/22  4:00 PM    Specimen: Urine   Result Value Ref Range    Color Yellow      Appearance Clear Clear      Specific gravity 1.010 1.003 - 1.030      pH (UA) 8.0 5.0 - 8.0      Protein Negative Negative mg/dL    Glucose Negative Negative mg/dL    Ketone Negative Negative mg/dL    Bilirubin Negative Negative      Blood Negative Negative      Urobilinogen 0.1 (L) 0.2 - 1.0 EU/dL    Nitrites Negative Negative      Leukocyte Esterase Negative Negative      WBC 0-4 0 - 4 /hpf    RBC 0-5 0 - 5 /hpf    Epithelial cells Few Few /lpf    Bacteria Negative Negative /hpf    UA:UC IF INDICATED Culture not indicated by UA result Culture not indicated by UA result     EKG, 12 LEAD, INITIAL    Collection Time: 08/11/22  4:02 PM   Result Value Ref Range    Ventricular Rate 64 BPM    Atrial Rate 64 BPM    P-R Interval 150 ms    QRS Duration 126 ms    Q-T Interval 446 ms    QTC Calculation (Bezet) 460 ms    Calculated P Axis 69 degrees    Calculated R Axis 23 degrees    Calculated T Axis 17 degrees    Diagnosis       Atrial-paced rhythm  Right bundle branch block  Abnormal ECG  When compared with ECG of 06-DEC-2020 19:19,  Electronic atrial pacemaker has replaced Sinus rhythm  Confirmed by ELIEL BUTTERFIELD, Isabelle Dykes ([id number]) on 8/11/2022 0:33:20 PM     METABOLIC PANEL, COMPREHENSIVE    Collection Time: 08/11/22  4:30 PM   Result Value Ref Range    Sodium 142 136 - 145 mmol/L    Potassium 3.5 3.5 - 5.1 mmol/L    Chloride 102 97 - 108 mmol/L    CO2 33 (H) 21 - 32 mmol/L    Anion gap 7 5 - 15 mmol/L    Glucose 125 (H) 65 - 100 mg/dL    BUN 15 6 - 20 mg/dL    Creatinine 0.85 0.55 - 1.02 mg/dL    BUN/Creatinine ratio 18 12 - 20      GFR est AA >60 >60 ml/min/1.73m2    GFR est non-AA >60 >60 ml/min/1.73m2    Calcium 9.3 8.5 - 10.1 mg/dL    Bilirubin, total 1.1 (H) 0.2 - 1.0 mg/dL    AST (SGOT) 37 15 - 37 U/L    ALT (SGPT) 55 12 - 78 U/L    Alk. phosphatase 63 45 - 117 U/L    Protein, total 7.8 6.4 - 8.2 g/dL    Albumin 4.1 3.5 - 5.0 g/dL    Globulin 3.7 2.0 - 4.0 g/dL    A-G Ratio 1.1 1.1 - 2.2     CBC WITH AUTOMATED DIFF    Collection Time: 08/11/22  4:30 PM   Result Value Ref Range    WBC 7.0 3.6 - 11.0 K/uL    RBC 4.37 3.80 - 5.20 M/uL    HGB 13.6 11.5 - 16.0 g/dL    HCT 42.2 35.0 - 47.0 %    MCV 96.6 80.0 - 99.0 FL    MCH 31.1 26.0 - 34.0 PG    MCHC 32.2 30.0 - 36.5 g/dL    RDW 13.9 11.5 - 14.5 %    PLATELET 603 (L) 350 - 400 K/uL    NEUTROPHILS 72 32 - 75 %    LYMPHOCYTES 20 12 - 49 %    MONOCYTES 7 5 - 13 %    EOSINOPHILS 1 0 - 7 %    BASOPHILS 0 0 - 1 %    IMMATURE GRANULOCYTES 0 0.0 - 0.5 %    ABS. NEUTROPHILS 5.0 1.8 - 8.0 K/UL    ABS. LYMPHOCYTES 1.4 0.8 - 3.5 K/UL    ABS. MONOCYTES 0.5 0.0 - 1.0 K/UL    ABS. EOSINOPHILS 0.1 0.0 - 0.4 K/UL    ABS. BASOPHILS 0.0 0.0 - 0.1 K/UL    ABS. IMM. GRANS. 0.0 0.00 - 0.04 K/UL    DF AUTOMATED     TROPONIN-HIGH SENSITIVITY    Collection Time: 08/11/22  4:30 PM   Result Value Ref Range    Troponin-High Sensitivity 27 0 - 51 ng/L       Radiologic Studies  CT HEAD WO CONT   Final Result   No acute intracranial process. Imaging findings consistent with minimal chronic microvascular ischemic change. There is a mild to moderate degree of cerebral atrophy. CT Results  (Last 48 hours)                 08/11/22 1615  CT HEAD WO CONT Final result    Impression:  No acute intracranial process.        Imaging findings consistent with minimal chronic microvascular ischemic change. There is a mild to moderate degree of cerebral atrophy. Narrative:  CLINICAL HISTORY: htn, dizzy   INDICATION: htn, dizzy   COMPARISON: None. CT dose reduction was achieved through use of a standardized protocol tailored   for this examination and automatic exposure control for dose modulation. TECHNIQUE: Serial axial images with a collimation of 5 mm were obtained from the   skull base through the vertex     FINDINGS:    There is sulcal and ventricular prominence. Confluent periventricular and   scattered foci of hypodensity in the cerebral white matter. There is no evidence   of an acute infarction, hemorrhage, or mass-effect. There is no evidence of   midline shift or hydrocephalus. Posterior fossa structures are unremarkable. No   extra-axial collections are seen. Mastoid air cells are well pneumatized and clear. There is no evidence of depressed skull fractures of soft tissue swelling. CXR Results  (Last 48 hours)      None          ------------------------------------------------------------------------------------------------------------  The exam and treatment you received in the Emergency Department were for an urgent problem and are not intended as complete care. It is important that you follow-up with a doctor, nurse practitioner, or physician assistant to:  (1) confirm your diagnosis,  (2) re-evaluation of changes in your illness and treatment, and  (3) for ongoing care. Please take your discharge instructions with you when you go to your follow-up appointment. If you have any problem arranging a follow-up appointment, contact the Emergency Department. If your symptoms become worse or you do not improve as expected and you are unable to reach your health care provider, please return to the Emergency Department. We are available 24 hours a day.      If a prescription has been provided, please have it filled as soon as possible to prevent a delay in treatment. If you have any questions or reservations about taking the medication due to side effects or interactions with other medications, please call your primary care provider or contact the ER.

## 2022-09-02 ENCOUNTER — APPOINTMENT (OUTPATIENT)
Dept: CT IMAGING | Age: 82
End: 2022-09-02
Attending: FAMILY MEDICINE
Payer: MEDICARE

## 2022-09-02 ENCOUNTER — HOSPITAL ENCOUNTER (EMERGENCY)
Age: 82
Discharge: HOME | End: 2022-09-02
Attending: EMERGENCY MEDICINE
Payer: MEDICARE

## 2022-09-02 VITALS
HEIGHT: 67 IN | OXYGEN SATURATION: 100 % | RESPIRATION RATE: 17 BRPM | HEART RATE: 60 BPM | SYSTOLIC BLOOD PRESSURE: 149 MMHG | DIASTOLIC BLOOD PRESSURE: 77 MMHG | WEIGHT: 150 LBS | BODY MASS INDEX: 23.54 KG/M2 | TEMPERATURE: 98.5 F

## 2022-09-02 DIAGNOSIS — R31.29 MICROSCOPIC HEMATURIA: ICD-10-CM

## 2022-09-02 DIAGNOSIS — R10.12 ABDOMINAL PAIN, LUQ (LEFT UPPER QUADRANT): Primary | ICD-10-CM

## 2022-09-02 LAB
ANION GAP SERPL CALC-SCNC: 10 MMOL/L (ref 5–15)
APPEARANCE UR: CLEAR
BACTERIA URNS QL MICRO: NEGATIVE /HPF
BILIRUB UR QL: NEGATIVE
BUN SERPL-MCNC: 22 MG/DL (ref 6–20)
BUN/CREAT SERPL: 24 (ref 12–20)
CA-I BLD-MCNC: 9.5 MG/DL (ref 8.5–10.1)
CHLORIDE SERPL-SCNC: 97 MMOL/L (ref 97–108)
CO2 SERPL-SCNC: 32 MMOL/L (ref 21–32)
COLOR UR: YELLOW
CREAT SERPL-MCNC: 0.93 MG/DL (ref 0.55–1.02)
ERYTHROCYTE [DISTWIDTH] IN BLOOD BY AUTOMATED COUNT: 13.9 % (ref 11.5–14.5)
GLUCOSE SERPL-MCNC: 118 MG/DL (ref 65–100)
GLUCOSE UR STRIP.AUTO-MCNC: NEGATIVE MG/DL
HCT VFR BLD AUTO: 39.6 % (ref 35–47)
HGB BLD-MCNC: 12.8 G/DL (ref 11.5–16)
HGB UR QL STRIP: ABNORMAL
KETONES UR QL STRIP.AUTO: NEGATIVE MG/DL
LEUKOCYTE ESTERASE UR QL STRIP.AUTO: ABNORMAL
LIPASE SERPL-CCNC: 53 U/L (ref 73–393)
MCH RBC QN AUTO: 30.9 PG (ref 26–34)
MCHC RBC AUTO-ENTMCNC: 32.3 G/DL (ref 30–36.5)
MCV RBC AUTO: 95.7 FL (ref 80–99)
NITRITE UR QL STRIP.AUTO: NEGATIVE
PH UR STRIP: 6.5 [PH] (ref 5–8)
PLATELET # BLD AUTO: 125 K/UL (ref 150–400)
PMV BLD AUTO: 13.5 FL (ref 8.9–12.9)
POTASSIUM SERPL-SCNC: 3.3 MMOL/L (ref 3.5–5.1)
PROT UR STRIP-MCNC: NEGATIVE MG/DL
RBC # BLD AUTO: 4.14 M/UL (ref 3.8–5.2)
RBC #/AREA URNS HPF: NORMAL /HPF (ref 0–5)
SODIUM SERPL-SCNC: 139 MMOL/L (ref 136–145)
SP GR UR REFRACTOMETRY: <1.005 (ref 1–1.03)
UROBILINOGEN UR QL STRIP.AUTO: 0.1 EU/DL (ref 0.2–1)
WBC # BLD AUTO: 5.6 K/UL (ref 3.6–11)
WBC URNS QL MICRO: NORMAL /HPF (ref 0–4)

## 2022-09-02 PROCEDURE — 74011250637 HC RX REV CODE- 250/637: Performed by: FAMILY MEDICINE

## 2022-09-02 PROCEDURE — 85027 COMPLETE CBC AUTOMATED: CPT

## 2022-09-02 PROCEDURE — 80048 BASIC METABOLIC PNL TOTAL CA: CPT

## 2022-09-02 PROCEDURE — 81001 URINALYSIS AUTO W/SCOPE: CPT

## 2022-09-02 PROCEDURE — 36415 COLL VENOUS BLD VENIPUNCTURE: CPT

## 2022-09-02 PROCEDURE — 83690 ASSAY OF LIPASE: CPT

## 2022-09-02 PROCEDURE — 99284 EMERGENCY DEPT VISIT MOD MDM: CPT

## 2022-09-02 PROCEDURE — 74176 CT ABD & PELVIS W/O CONTRAST: CPT

## 2022-09-02 RX ORDER — FAMOTIDINE 20 MG/1
20 TABLET, FILM COATED ORAL 2 TIMES DAILY
Qty: 20 TABLET | Refills: 0 | Status: SHIPPED | OUTPATIENT
Start: 2022-09-02

## 2022-09-02 RX ORDER — POTASSIUM CHLORIDE 750 MG/1
40 TABLET, FILM COATED, EXTENDED RELEASE ORAL
Status: COMPLETED | OUTPATIENT
Start: 2022-09-02 | End: 2022-09-02

## 2022-09-02 RX ORDER — CHLORTHALIDONE 25 MG/1
1 TABLET ORAL DAILY
COMMUNITY
Start: 2022-08-18 | End: 2023-08-18

## 2022-09-02 RX ADMIN — POTASSIUM CHLORIDE 40 MEQ: 750 TABLET, FILM COATED, EXTENDED RELEASE ORAL at 20:20

## 2022-09-02 NOTE — ED TRIAGE NOTES
Pt has been having intermittent tenderness to left waistline area of the abdomen. No n/v/d. Sometimes she gets lightheaded and when that happens she checks her BP and it is elevated.   160s over 80s

## 2022-09-02 NOTE — ED PROVIDER NOTES
EMERGENCY DEPARTMENT HISTORY AND PHYSICAL EXAM      Date: 9/2/2022  Patient Name: Sabrina Berrios  Patient Age and Sex: 79 y.o. female     History of Presenting Illness     No chief complaint on file. History Provided By: Patient    HPI: Sabrina Berrios is an 79-year-old female with a history of hypertension, seasonal allergies, presenting with intermittent pains to her left upper quadrant area. For a while now, several months, she has been having these intermittent pains that is like a pinpricking sensation in her left upper quadrant area. Associated with some lightheadedness, feeling off balance. States that she has been seen before with heart work-up as well as neuro work-up. Has had CTs of her head. Patient states that right now it is as pinpricking sensation but if she palpates it it does not cause worsening pain. Denies any history of any abdominal issues. Denies any chest pain, shortness of breath, nausea, vomiting, diarrhea, constipation. Patient states her blood pressure is always increased when she comes to the ER. Has been taking all of her medications as prescribed. Has not followed up with primary care about this. There are no other complaints, changes, or physical findings at this time. PCP: Ana Lay MD    No current facility-administered medications on file prior to encounter. Current Outpatient Medications on File Prior to Encounter   Medication Sig Dispense Refill    chlorthalidone (HYGROTON) 25 mg tablet Take 1 Tablet by mouth daily. aspirin delayed-release 81 mg tablet Take 81 mg by mouth daily. isosorbide mononitrate (ISMO, MONOKET) 20 mg tablet Take 60 mg by mouth in the morning. amLODIPine (NORVASC) 10 mg tablet Take 10 mg by mouth daily. atorvastatin (LIPITOR) 20 mg tablet Take 20 mg by mouth daily. cetirizine (ZYRTEC) 10 mg tablet Take 10 mg by mouth daily.       carvediloL (COREG) 25 mg tablet Take 25 mg by mouth two (2) times daily (with meals). Lactobacillus acidophilus (PROBIOTIC PO) Take 1 Cap by mouth daily. co-enzyme Q-10 (CO Q-10) 100 mg capsule Take 100 mg by mouth daily. COLLAGEN 1 Cap by Does Not Apply route daily. multivitamin with minerals (VANESSA MULTIPLE/CHELATED MINERAL PO) Take 1 Cap by mouth daily. meclizine (ANTIVERT) 25 mg tablet Take 1 Tablet by mouth three (3) times daily as needed for Dizziness. (Patient not taking: Reported on 9/2/2022) 20 Tablet 0    NUTRITIONAL SUPPLEMENTS PO Take 1 Cap by mouth daily. Grapine (High Potency) (Patient not taking: Reported on 9/2/2022)      NUTRITIONAL SUPPLEMENTS PO Take 2 Caps by mouth daily. Food Enzymes (Patient not taking: Reported on 9/2/2022)      NUTRITIONAL SUPPLEMENTS PO Take 2 Caps by mouth daily. I-X (organic Iron) (Patient not taking: Reported on 9/2/2022)      NUTRITIONAL SUPPLEMENTS PO Take 1 Cap by mouth daily. Yeast Fungal (Patient not taking: Reported on 9/2/2022)      lactose-reduced food (NUTRITIONAL SUPPLEMENT PO) Take 1 Cap by mouth daily. Everflex (Patient not taking: Reported on 9/2/2022)      HAWTHORN BERRY PO Take 2 Caps by mouth daily. (Patient not taking: Reported on 9/2/2022)      lactose-reduced food (NUTRITIONAL SUPPLEMENT PO) Take 1 Cap by mouth daily. Herbal Trace Mineral (Patient not taking: Reported on 9/2/2022)      lactose-reduced food (NUTRITIONAL SUPPLEMENT PO) Take 1 Cap by mouth daily. Nutri Calm (stress) (adrenals) (Patient not taking: Reported on 9/2/2022)      cholecalciferol (VITAMIN D3) 25 mcg (1,000 unit) cap Take 1,000 Units by mouth daily. (Patient not taking: Reported on 9/2/2022)      nitroglycerin (NITROSTAT) 0.3 mg SL tablet 0.4 mg by SubLINGual route every five (5) minutes as needed for Chest Pain. calcium carb/vitamin D2/soyb (ONE-A-DAY BONE STRENGTH PO) Take 1 Cap by mouth three (3) times daily. (Patient not taking: Reported on 9/2/2022)      COLOSTRUM, BOVINE PO Take 2 Bottles by mouth nightly.  (Patient not taking: Reported on 9/2/2022)      lactose-reduced food (NUTRITIONAL SUPPLEMENT PO) Take 1 Cap by mouth daily. Panothenic Acid (Patient not taking: Reported on 9/2/2022)         Past History     Past Medical History:  Past Medical History:   Diagnosis Date    Angina at rest [facility])     Environmental and seasonal allergies     High cholesterol     History of heart attack     Hypertension     Pacemaker        Past Surgical History:  Past Surgical History:   Procedure Laterality Date    HX PACEMAKER         Family History:  History reviewed. No pertinent family history. Social History:  Social History     Tobacco Use    Smoking status: Former    Smokeless tobacco: Never   Substance Use Topics    Alcohol use: Not Currently    Drug use: Not Currently       Allergies: Allergies   Allergen Reactions    Augmentin [Amoxicillin-Pot Clavulanate] Swelling    Codeine Nausea Only         Review of Systems   Review of Systems   Constitutional:  Negative for chills and fever. Respiratory:  Negative for cough and shortness of breath. Cardiovascular:  Negative for chest pain. Gastrointestinal:  Positive for abdominal pain. Negative for constipation, diarrhea, nausea and vomiting. Genitourinary:  Negative for dysuria, frequency and hematuria. Musculoskeletal:  Positive for gait problem. Skin:         Sharp pinprick sensation is left side abdomen   Neurological:  Positive for dizziness. Negative for weakness and numbness. All other systems reviewed and are negative. Physical Exam   Physical Exam  Vitals and nursing note reviewed. Constitutional:       Appearance: She is well-developed. HENT:      Head: Normocephalic and atraumatic. Nose: Nose normal.      Mouth/Throat:      Mouth: Mucous membranes are moist.   Eyes:      Extraocular Movements: Extraocular movements intact. Conjunctiva/sclera: Conjunctivae normal.      Pupils: Pupils are equal, round, and reactive to light.    Cardiovascular:      Rate and Rhythm: Normal rate and regular rhythm. Pulmonary:      Effort: Pulmonary effort is normal. No respiratory distress. Breath sounds: Normal breath sounds. Abdominal:      General: There is no distension. Palpations: Abdomen is soft. Tenderness: There is no abdominal tenderness. Comments: Patient showing me where her pain is and its in the left upper quadrant and left lower quadrant area with no tenderness. No rashes or vesicles noted   Musculoskeletal:         General: Normal range of motion. Cervical back: Normal range of motion and neck supple. Skin:     General: Skin is warm and dry. Neurological:      General: No focal deficit present. Mental Status: She is alert and oriented to person, place, and time. Mental status is at baseline. Cranial Nerves: No cranial nerve deficit.       Comments: CN 2-12 intact, 5/5 strength in all 4 extremities, Finger to nose intact, negative Romberg, normal gait to her room   Psychiatric:         Mood and Affect: Mood normal.        Diagnostic Study Results     Labs -     Recent Results (from the past 12 hour(s))   CBC W/O DIFF    Collection Time: 09/02/22  7:00 PM   Result Value Ref Range    WBC 5.6 3.6 - 11.0 K/uL    RBC 4.14 3.80 - 5.20 M/uL    HGB 12.8 11.5 - 16.0 g/dL    HCT 39.6 35.0 - 47.0 %    MCV 95.7 80.0 - 99.0 FL    MCH 30.9 26.0 - 34.0 PG    MCHC 32.3 30.0 - 36.5 g/dL    RDW 13.9 11.5 - 14.5 %    PLATELET 245 (L) 857 - 400 K/uL    MPV 13.5 (H) 8.9 - 61.7 FL   METABOLIC PANEL, BASIC    Collection Time: 09/02/22  7:00 PM   Result Value Ref Range    Sodium 139 136 - 145 mmol/L    Potassium 3.3 (L) 3.5 - 5.1 mmol/L    Chloride 97 97 - 108 mmol/L    CO2 32 21 - 32 mmol/L    Anion gap 10 5 - 15 mmol/L    Glucose 118 (H) 65 - 100 mg/dL    BUN 22 (H) 6 - 20 mg/dL    Creatinine 0.93 0.55 - 1.02 mg/dL    BUN/Creatinine ratio 24 (H) 12 - 20      GFR est AA >60 >60 ml/min/1.73m2    GFR est non-AA 58 (L) >60 ml/min/1.73m2    Calcium 9.5 8.5 - 10.1 mg/dL   LIPASE    Collection Time: 09/02/22  7:00 PM   Result Value Ref Range    Lipase 53 (L) 73 - 393 U/L   URINALYSIS W/ RFLX MICROSCOPIC    Collection Time: 09/02/22  7:45 PM   Result Value Ref Range    Color Yellow      Appearance Clear Clear      Specific gravity <1.005 1.003 - 1.030    pH (UA) 6.5 5.0 - 8.0      Protein Negative Negative mg/dL    Glucose Negative Negative mg/dL    Ketone Negative Negative mg/dL    Bilirubin Negative Negative      Blood Small (A) Negative      Urobilinogen 0.1 (L) 0.2 - 1.0 EU/dL    Nitrites Negative Negative      Leukocyte Esterase Large (A) Negative     URINE MICROSCOPIC    Collection Time: 09/02/22  7:45 PM   Result Value Ref Range    WBC 5-10 0 - 4 /hpf    RBC 0-5 0 - 5 /hpf    Bacteria Negative Negative /hpf       Radiologic Studies -   CT ABD PELV WO CONT   Final Result   Nonobstructing bilateral nephrolithiasis. CT Results  (Last 48 hours)                 09/02/22 1952  CT ABD PELV WO CONT Final result    Impression:  Nonobstructing bilateral nephrolithiasis. Narrative:  INDICATION: Intermittent left-sided abdominal pain, microscopic hematuria. Exam: Noncontrast CT of the abdomen and pelvis is performed with 5 mm   collimation. Sagittal and coronal reformatted images were also performed. CT dose reduction was achieved through the use of a standardized protocol   tailored for this examination and automatic exposure control for dose   modulation. Direct comparison is made to prior CT dated February 2018. FINDINGS:       The visualized lung bases are clear. Abdomen:        Liver: There are several renal cysts       Spleen: The spleen is normal on noncontrast images. Adrenals: The adrenals are normal on noncontrast images. Pancreas: The pancreas is normal on noncontrast images. Gallbladder: The gallbladder is normal on noncontrast images.         Kidneys: Nonobstructing bilateral nephrolithiasis. There is no hydronephrosis. Several renal cysts are noted. Bowel: No thickened or dilated loop of large or small bowel seen. Colonic   diverticulosis is noted, most extensive in the sigmoid colon. Appendix: The appendix is normal.       Pelvis: Urinary bladder is partially filled and grossly normal.       Bones: The osseous structures are diffusely demineralized. There is a levoconvex   lumbar scoliosis. Miscellaneous: There is no free intraperitoneal gas or fluid. There is no focal   fluid collection to suggest abscess. CXR Results  (Last 48 hours)      None              Medical Decision Making   I am the first provider for this patient. I reviewed the vital signs, available nursing notes, past medical history, past surgical history, family history and social history. Vital Signs-Reviewed the patient's vital signs. Patient Vitals for the past 12 hrs:   Temp Pulse Resp BP SpO2   09/02/22 1909 -- 60 17 (!) 149/77 100 %   09/02/22 1843 98.5 °F (36.9 °C) 72 18 (!) 191/82 98 %       Records Reviewed: Nursing Notes and Old Medical Records    Provider Notes (Medical Decision Making):   Patient presenting with a pinprick sharp sensation to her left upper quadrant and left lower quadrant area intermittently over the past few months. Unlikely any severe acute pathology. No signs of shingles. Reviewed the labs that were done on August 11 which evaluated chest pain. Will get belly labs looking at her white count, pancreas. No urine symptoms. We will also check electrolytes and kidney function. ED Course:   Initial assessment performed. The patients presenting problems have been discussed, and they are in agreement with the care plan formulated and outlined with them. I have encouraged them to ask questions as they arise throughout their visit.     ED Course as of 09/02/22 2017   Fri Sep 02, 2022   1907 Patient signed out to Dr. Julia Bain, with the plan of following up on her belly labs, blood pressure, and likely will be discharged home. Patient's daughter also at bedside and counseled on reassuring physical exam including neurologic exam and belly exam.  Possibly related to her stomach and either gas or acid reflux causing these intermittent pinpricking sensations versus muscle related [JS]   2014 I assumed care of this patient at shift change. She is resting comfortably in bed. Urinalysis demonstrates small blood. CT abdomen pelvis ordered to evaluate for ureteral calculi. CT shows nonobstructing bilateral nephrolithiasis. Patient to follow-up with urology on outpatient basis. Slight hypokalemia which was replaced. Discharged home asymptomatic in stable and ambulatory condition. [CATINA]      ED Course User Index  [CATINA] Villa Leslie DO  [JS] Cam Machado MD     Critical Care Time:   0    Disposition:  Discharge Note:  The patient has been re-evaluated and is ready for discharge. Reviewed available results with patient. Counseled patient on diagnosis and care plan. Patient has expressed understanding, and all questions have been answered. Patient agrees with plan and agrees to follow up as recommended, or to return to the ED if their symptoms worsen. Discharge instructions have been provided and explained to the patient, along with reasons to return to the ED. PLAN:  Current Discharge Medication List        START taking these medications    Details   famotidine (Pepcid) 20 mg tablet Take 1 Tablet by mouth two (2) times a day. Qty: 20 Tablet, Refills: 0  Start date: 9/2/2022           2.    Follow-up Information       Follow up With Specialties Details Why Contact Info    Parul Howell MD Internal Medicine Physician Schedule an appointment as soon as possible for a visit   [place] [id number] [phone]      [facility] S[facility]  Call   [address] [address]  [phone]          3. Return to ED if worse     Diagnosis     Clinical Impression:   1. Abdominal pain, LUQ (left upper quadrant)    2. Microscopic hematuria        Attestations:  Grant Bingham M.D., am the primary clinician of record. Please note that this dictation was completed with Chat[employer], the [employer] voice recognition software. Quite often unanticipated grammatical, syntax, homophones, and other interpretive errors are inadvertently transcribed by the computer software. Please disregard these errors. Please excuse any errors that have escaped final proofreading. Thank you.

## 2022-09-03 NOTE — DISCHARGE INSTRUCTIONS
Thank you! Thank you for allowing me to care for you in the emergency department. It is my goal to provide you with excellent care. If you have not received excellent quality care, please ask to speak to the nurse manager. Please fill out the survey that will come to you by mail or email since we listen to your feedback! Below you will find a list of your tests from today's visit. Should you have any questions, please do not hesitate to call the emergency department.     Labs  Recent Results (from the past 12 hour(s))   CBC W/O DIFF    Collection Time: 09/02/22  7:00 PM   Result Value Ref Range    WBC 5.6 3.6 - 11.0 K/uL    RBC 4.14 3.80 - 5.20 M/uL    HGB 12.8 11.5 - 16.0 g/dL    HCT 39.6 35.0 - 47.0 %    MCV 95.7 80.0 - 99.0 FL    MCH 30.9 26.0 - 34.0 PG    MCHC 32.3 30.0 - 36.5 g/dL    RDW 13.9 11.5 - 14.5 %    PLATELET 357 (L) 768 - 400 K/uL    MPV 13.5 (H) 8.9 - 66.4 FL   METABOLIC PANEL, BASIC    Collection Time: 09/02/22  7:00 PM   Result Value Ref Range    Sodium 139 136 - 145 mmol/L    Potassium 3.3 (L) 3.5 - 5.1 mmol/L    Chloride 97 97 - 108 mmol/L    CO2 32 21 - 32 mmol/L    Anion gap 10 5 - 15 mmol/L    Glucose 118 (H) 65 - 100 mg/dL    BUN 22 (H) 6 - 20 mg/dL    Creatinine 0.93 0.55 - 1.02 mg/dL    BUN/Creatinine ratio 24 (H) 12 - 20      GFR est AA >60 >60 ml/min/1.73m2    GFR est non-AA 58 (L) >60 ml/min/1.73m2    Calcium 9.5 8.5 - 10.1 mg/dL   LIPASE    Collection Time: 09/02/22  7:00 PM   Result Value Ref Range    Lipase 53 (L) 73 - 393 U/L   URINALYSIS W/ RFLX MICROSCOPIC    Collection Time: 09/02/22  7:45 PM   Result Value Ref Range    Color Yellow      Appearance Clear Clear      Specific gravity <1.005 1.003 - 1.030    pH (UA) 6.5 5.0 - 8.0      Protein Negative Negative mg/dL    Glucose Negative Negative mg/dL    Ketone Negative Negative mg/dL    Bilirubin Negative Negative      Blood Small (A) Negative      Urobilinogen 0.1 (L) 0.2 - 1.0 EU/dL    Nitrites Negative Negative Leukocyte Esterase Large (A) Negative     URINE MICROSCOPIC    Collection Time: 09/02/22  7:45 PM   Result Value Ref Range    WBC 5-10 0 - 4 /hpf    RBC 0-5 0 - 5 /hpf    Bacteria Negative Negative /hpf       Radiologic Studies  CT ABD PELV WO CONT   Final Result   Nonobstructing bilateral nephrolithiasis. CT Results  (Last 48 hours)                 09/02/22 1952  CT ABD PELV WO CONT Final result    Impression:  Nonobstructing bilateral nephrolithiasis. Narrative:  INDICATION: Intermittent left-sided abdominal pain, microscopic hematuria. Exam: Noncontrast CT of the abdomen and pelvis is performed with 5 mm   collimation. Sagittal and coronal reformatted images were also performed. CT dose reduction was achieved through the use of a standardized protocol   tailored for this examination and automatic exposure control for dose   modulation. Direct comparison is made to prior CT dated February 2018. FINDINGS:       The visualized lung bases are clear. Abdomen:        Liver: There are several renal cysts       Spleen: The spleen is normal on noncontrast images. Adrenals: The adrenals are normal on noncontrast images. Pancreas: The pancreas is normal on noncontrast images. Gallbladder: The gallbladder is normal on noncontrast images. Kidneys: Nonobstructing bilateral nephrolithiasis. There is no hydronephrosis. Several renal cysts are noted. Bowel: No thickened or dilated loop of large or small bowel seen. Colonic   diverticulosis is noted, most extensive in the sigmoid colon. Appendix: The appendix is normal.       Pelvis: Urinary bladder is partially filled and grossly normal.       Bones: The osseous structures are diffusely demineralized. There is a levoconvex   lumbar scoliosis. Miscellaneous: There is no free intraperitoneal gas or fluid. There is no focal   fluid collection to suggest abscess.                  CXR Results  (Last 48 hours)      None          ------------------------------------------------------------------------------------------------------------  The exam and treatment you received in the Emergency Department were for an urgent problem and are not intended as complete care. It is important that you follow-up with a doctor, nurse practitioner, or physician assistant to:  (1) confirm your diagnosis,  (2) re-evaluation of changes in your illness and treatment, and  (3) for ongoing care. Please take your discharge instructions with you when you go to your follow-up appointment. If you have any problem arranging a follow-up appointment, contact the Emergency Department. If your symptoms become worse or you do not improve as expected and you are unable to reach your health care provider, please return to the Emergency Department. We are available 24 hours a day. If a prescription has been provided, please have it filled as soon as possible to prevent a delay in treatment. If you have any questions or reservations about taking the medication due to side effects or interactions with other medications, please call your primary care provider or contact the ER.

## 2022-12-01 PROBLEM — I10 HYPERTENSION: Status: ACTIVE | Noted: 2022-12-01

## 2022-12-01 PROBLEM — I25.2 HISTORY OF HEART ATTACK: Status: ACTIVE | Noted: 2022-12-01

## 2022-12-01 RX ORDER — LORATADINE 10 MG/1
10 TABLET ORAL DAILY
COMMUNITY

## 2022-12-06 ENCOUNTER — OFFICE VISIT (OUTPATIENT)
Dept: INTERNAL MEDICINE CLINIC | Age: 82
End: 2022-12-06
Payer: MEDICARE

## 2022-12-06 VITALS
HEART RATE: 63 BPM | WEIGHT: 150 LBS | DIASTOLIC BLOOD PRESSURE: 70 MMHG | HEIGHT: 67 IN | TEMPERATURE: 96.9 F | BODY MASS INDEX: 23.54 KG/M2 | SYSTOLIC BLOOD PRESSURE: 130 MMHG | RESPIRATION RATE: 20 BRPM | OXYGEN SATURATION: 98 %

## 2022-12-06 DIAGNOSIS — I25.2 HISTORY OF HEART ATTACK: ICD-10-CM

## 2022-12-06 DIAGNOSIS — D69.6 THROMBOCYTOPENIA (HCC): ICD-10-CM

## 2022-12-06 DIAGNOSIS — I10 PRIMARY HYPERTENSION: Primary | ICD-10-CM

## 2022-12-06 DIAGNOSIS — R73.09 ABNORMAL BLOOD SUGAR: ICD-10-CM

## 2022-12-06 DIAGNOSIS — E78.00 HYPERCHOLESTEREMIA: ICD-10-CM

## 2022-12-06 DIAGNOSIS — N20.0 KIDNEY STONES: ICD-10-CM

## 2022-12-06 DIAGNOSIS — Z95.0 PACEMAKER: ICD-10-CM

## 2022-12-06 PROBLEM — J30.89 ENVIRONMENTAL AND SEASONAL ALLERGIES: Status: ACTIVE | Noted: 2022-12-06

## 2022-12-06 PROCEDURE — 3075F SYST BP GE 130 - 139MM HG: CPT | Performed by: INTERNAL MEDICINE

## 2022-12-06 PROCEDURE — 1123F ACP DISCUSS/DSCN MKR DOCD: CPT | Performed by: INTERNAL MEDICINE

## 2022-12-06 PROCEDURE — 3078F DIAST BP <80 MM HG: CPT | Performed by: INTERNAL MEDICINE

## 2022-12-06 PROCEDURE — 99214 OFFICE O/P EST MOD 30 MIN: CPT | Performed by: INTERNAL MEDICINE

## 2022-12-06 NOTE — PROGRESS NOTES
1. \"Have you been to the ER, urgent care clinic since your last visit? Hospitalized since your last visit? \" Yes When: [id number] Where: [employer]  Reason for visit: pain on left side     2. \"Have you seen or consulted any other health care providers outside of the [address] [place] [address] since your last visit? \" Yes When: 2 week ago  Where: cardiologist  Reason for visit: follow visit       3. For patients aged 39-70: Has the patient had a colonoscopy / FIT/ Cologuard? NA - based on age      If the patient is female:    4. For patients aged 41-77: Has the patient had a mammogram within the past 2 years? NA - based on age or sex      11. For patients aged 21-65: Has the patient had a pap smear? NA - based on age or sex    Chief Complaint   Patient presents with    Follow-up    Hypertension    Cholesterol Problem     Visit Vitals  /70 (BP 1 Location: Left upper arm, BP Patient Position: Sitting, BP Cuff Size: Adult)   Pulse 63   Temp 96.9 °F (36.1 °C) (Temporal)   Resp 20   Ht 5' 7\" (1.702 m)   Wt 150 lb (68 kg)   SpO2 98%   BMI 23.49 kg/m²       [facility]  [address] [place] [address]  Phone: [phone]      Tiana Ma (: 1940) is a 79 y.o. female, established patient, here for evaluation of the following chief complaint(s):  Follow-up, Hypertension, and Cholesterol Problem         SUBJECTIVE/OBJECTIVE:  HPI: This is a 28-year-old black female with past medical history of hypertension, coronary artery disease, sick sinus syndrome status post pacemaker here for follow-up. Patient had seen her cardiologist in  and was placed on chlorthalidone 12.5 mg. Patient states she was taken off vitamin D. Patient had blood work done on 22 sodium was 141. Potassium 3.7. Blood sugar was 96. BUN 20. Creatinine 0.89. Calcium was 9.5. Magnesium was 2. On  patient went to the emergency room for abdominal pain  . Patient had a CT scan done at that time which showed nonobstructing bilateral nephrolithiasis. Colonic diverticulosis noted most extensive in the sigmoid colon. Patient had a CT head done on 8/11/2022 which showed no acute process. Mild to moderate degree of cerebral atrophy. Patient had flu vaccine and also COVID booster. Prior to Admission medications    Medication Sig Start Date End Date Taking? Authorizing Provider   chlorthalidone (HYGROTON) 25 mg tablet Take 1 Tablet by mouth daily. 8/18/22 8/18/23 Yes Brennan, MD Carlene   aspirin delayed-release 81 mg tablet Take 81 mg by mouth daily. Yes Other, MD Carlene   isosorbide mononitrate (ISMO, MONOKET) 20 mg tablet Take 60 mg by mouth in the morning. Yes Brennan, MD Carlene   amLODIPine (NORVASC) 10 mg tablet Take 10 mg by mouth daily. Yes Carlene Amin MD   atorvastatin (LIPITOR) 20 mg tablet Take 20 mg by mouth daily. Yes Brennan, MD Carlene   cetirizine (ZYRTEC) 10 mg tablet Take 10 mg by mouth daily. Yes Brennan, MD Carlene   carvediloL (COREG) 25 mg tablet Take 25 mg by mouth two (2) times daily (with meals). Yes Brennan, MD Carlene   Lactobacillus acidophilus (PROBIOTIC PO) Take 1 Cap by mouth daily. Yes Brennan, MD Carlene   co-enzyme Q-10 (CO Q-10) 100 mg capsule Take 100 mg by mouth daily. Yes Brennan, MD Carlene   COLLAGEN 1 Cap by Does Not Apply route daily. Yes Brennan, MD Carlene   multivitamin with minerals (VANESSA MULTIPLE/CHELATED MINERAL PO) Take 1 Cap by mouth daily. Yes Brennan, MD Carlene   nitroglycerin (NITROSTAT) 0.3 mg SL tablet 0.4 mg by SubLINGual route every five (5) minutes as needed for Chest Pain. Yes Brennan, MD Carlene   cholecalciferol (VITAMIN D3) 25 mcg (1,000 unit) cap Take 1,000 Units by mouth daily.   Patient not taking: No sig reported    Other, MD Carlene        Allergies   Allergen Reactions    Latex Unknown (comments)    Augmentin [Amoxicillin-Pot Clavulanate] Swelling    Ciprofloxacin Rash    Epi E-Z Pen Unknown (comments)    Codeine Nausea Only        Past Medical History:   Diagnosis Date    Angina at rest [facility])     Environmental and seasonal allergies     High cholesterol     History of heart attack     Hypercholesteremia     Hypertension     Pacemaker     Thrombocytopenia ([address]         Family History   Problem Relation Age of Onset    Hypertension Mother     Mult Sclerosis Brother     Cancer Maternal Grandmother     Hypertension Maternal Grandmother         Past Surgical History:   Procedure Laterality Date    HX ANGIOPLASTY      HX CATARACT REMOVAL      HX PACEMAKER      HX TUBAL LIGATION         Review of Systems   Constitutional:  Negative for chills and fever. HENT:  Negative for congestion, ear pain, nosebleeds, sinus pain, sore throat and tinnitus. Eyes:  Negative for redness. Respiratory:  Negative for cough and shortness of breath. Cardiovascular:  Negative for chest pain and palpitations. Gastrointestinal:  Negative for abdominal pain, diarrhea, nausea and vomiting. Endocrine: Negative for cold intolerance and polyuria. Genitourinary:  Negative for dysuria and hematuria. Musculoskeletal:  Negative for back pain and neck pain. Skin:  Negative for rash. Neurological:  Negative for dizziness and headaches. Psychiatric/Behavioral: Negative. /70 (BP 1 Location: Left upper arm, BP Patient Position: Sitting, BP Cuff Size: Adult)   Pulse 63   Temp 96.9 °F (36.1 °C) (Temporal)   Resp 20   Ht 5' 7\" (1.702 m)   Wt 150 lb (68 kg)   SpO2 98%   BMI 23.49 kg/m²      Physical Exam  Constitutional:       General: Not in acute distress. Appearance: Normal appearance. HENT:      Head: Normocephalic and atraumatic. Right Ear: External ear normal.      Left Ear: External ear normal.   Eyes:      General: No scleral icterus. Extraocular Movements: Extraocular movements intact. Conjunctiva/sclera: Conjunctivae normal.      Pupils: Pupils are equal, round, and reactive to light.    Cardiovascular:   Positive pacemaker left infraclavicular area. Rate and Rhythm: Regular rhythm. Heart sounds: Normal heart sounds. Short systolic murmur heard. Pulmonary:      Effort: Pulmonary effort is normal.      Breath sounds: Normal breath sounds. No wheezing or rales. Abdominal:      General: Bowel sounds are normal.      Palpations: Abdomen is soft. There is no mass. Tenderness: There is no abdominal tenderness. Musculoskeletal:         General: Normal range of motion. Cervical back: Neck supple. Lymphadenopathy:      Cervical: No cervical adenopathy. Skin:     General: Skin is warm and dry. Findings: No rash. Neurological:      General: No focal deficit present. Mental Status: In good spirits   ASSESSMENT/PLAN:  Below is the assessment and plan developed based on review of pertinent history, physical exam, labs, studies, and medications. 1. Primary hypertension. Blood pressure controlled. Patient was hypokalemic when she went to the emergency room. We will repeat her CMP.  -     METABOLIC PANEL, COMPREHENSIVE  2. Thrombocytopenia ([address] Last platelet count 068. We will continue to monitor.  -     CBC WITH AUTOMATED DIFF  3. Pacemaker. Seems doing okay. 4. Hypercholesteremia. Need to recheck.  -     LIPID PANEL  -     TSH 3RD GENERATION  5. Coronary artery disease. Keep up appointment with cardiologist.  6. Kidney stones. Offered appointment with urologist.  Patient states she is currently asymptomatic. Advised to increase fluid intake. -     URINALYSIS W/ REFLEX CULTURE  7. Abnormal blood sugar. We will recheck hemoglobin A1c.  -     HEMOGLOBIN A1C WITH EAG  Old records reviewed  No follow-ups on file. There are no Patient Instructions on file for this visit.      Health Maintenance Due   Topic Date Due    Depression Screen  Never done    DTaP/Tdap/Td series (1 - Tdap) Never done    Shingrix Vaccine Age 50> (1 of 2) Never done    Bone Densitometry (Dexa) Screening  Never done Pneumococcal 65+ years (1 - PCV) Never done    Medicare Yearly Exam  Never done    COVID-19 Vaccine (3 - Booster for Moderna series) 06/16/2021    Flu Vaccine (1) Never done        Aspects of this note may have been generated using voice recognition software. Despite editing, there may be unrecognized errors. An electronic signature was used to authenticate this note.   -- Albertina Spurling, MD

## 2023-02-08 RX ORDER — CETIRIZINE HYDROCHLORIDE 10 MG/1
10 TABLET ORAL DAILY
Qty: 90 TABLET | Refills: 1 | Status: SHIPPED | OUTPATIENT
Start: 2023-02-08

## 2023-02-08 NOTE — TELEPHONE ENCOUNTER
Requested Prescriptions     Pending Prescriptions Disp Refills    cetirizine (ZYRTEC) 10 mg tablet 90 Tablet 1     Sig: Take 1 Tablet by mouth daily.

## 2023-02-23 ENCOUNTER — HOSPITAL ENCOUNTER (EMERGENCY)
Age: 83
Discharge: HOME | End: 2023-02-23
Attending: EMERGENCY MEDICINE
Payer: MEDICARE

## 2023-02-23 VITALS
WEIGHT: 150 LBS | BODY MASS INDEX: 23.54 KG/M2 | HEIGHT: 67 IN | OXYGEN SATURATION: 99 % | TEMPERATURE: 98.7 F | DIASTOLIC BLOOD PRESSURE: 70 MMHG | SYSTOLIC BLOOD PRESSURE: 153 MMHG | HEART RATE: 60 BPM | RESPIRATION RATE: 16 BRPM

## 2023-02-23 DIAGNOSIS — E87.6 HYPOKALEMIA: ICD-10-CM

## 2023-02-23 DIAGNOSIS — R19.7 DIARRHEA, UNSPECIFIED TYPE: Primary | ICD-10-CM

## 2023-02-23 LAB
ALBUMIN SERPL-MCNC: 3.5 G/DL (ref 3.5–5)
ALBUMIN/GLOB SERPL: 1 (ref 1.1–2.2)
ALP SERPL-CCNC: 48 U/L (ref 45–117)
ALT SERPL-CCNC: 24 U/L (ref 12–78)
ANION GAP SERPL CALC-SCNC: 5 MMOL/L (ref 5–15)
AST SERPL W P-5'-P-CCNC: 31 U/L (ref 15–37)
BASOPHILS # BLD: 0 K/UL (ref 0–0.1)
BASOPHILS NFR BLD: 1 % (ref 0–1)
BILIRUB SERPL-MCNC: 0.7 MG/DL (ref 0.2–1)
BUN SERPL-MCNC: 14 MG/DL (ref 6–20)
BUN/CREAT SERPL: 15 (ref 12–20)
CA-I BLD-MCNC: 8.9 MG/DL (ref 8.5–10.1)
CHLORIDE SERPL-SCNC: 100 MMOL/L (ref 97–108)
CO2 SERPL-SCNC: 34 MMOL/L (ref 21–32)
CREAT SERPL-MCNC: 0.91 MG/DL (ref 0.55–1.02)
DIFFERENTIAL METHOD BLD: ABNORMAL
EOSINOPHIL # BLD: 0.2 K/UL (ref 0–0.4)
EOSINOPHIL NFR BLD: 3 % (ref 0–7)
ERYTHROCYTE [DISTWIDTH] IN BLOOD BY AUTOMATED COUNT: 14.3 % (ref 11.5–14.5)
GLOBULIN SER CALC-MCNC: 3.4 G/DL (ref 2–4)
GLUCOSE SERPL-MCNC: 117 MG/DL (ref 65–100)
HCT VFR BLD AUTO: 39 % (ref 35–47)
HGB BLD-MCNC: 12.6 G/DL (ref 11.5–16)
IMM GRANULOCYTES # BLD AUTO: 0 K/UL (ref 0–0.04)
IMM GRANULOCYTES NFR BLD AUTO: 0 % (ref 0–0.5)
LYMPHOCYTES # BLD: 1 K/UL (ref 0.8–3.5)
LYMPHOCYTES NFR BLD: 19 % (ref 12–49)
MCH RBC QN AUTO: 30.8 PG (ref 26–34)
MCHC RBC AUTO-ENTMCNC: 32.3 G/DL (ref 30–36.5)
MCV RBC AUTO: 95.4 FL (ref 80–99)
MONOCYTES # BLD: 0.5 K/UL (ref 0–1)
MONOCYTES NFR BLD: 10 % (ref 5–13)
NEUTS SEG # BLD: 3.5 K/UL (ref 1.8–8)
NEUTS SEG NFR BLD: 67 % (ref 32–75)
PLATELET # BLD AUTO: 84 K/UL (ref 150–400)
POTASSIUM SERPL-SCNC: 2.8 MMOL/L (ref 3.5–5.1)
PROT SERPL-MCNC: 6.9 G/DL (ref 6.4–8.2)
RBC # BLD AUTO: 4.09 M/UL (ref 3.8–5.2)
SODIUM SERPL-SCNC: 139 MMOL/L (ref 136–145)
WBC # BLD AUTO: 5.2 K/UL (ref 3.6–11)

## 2023-02-23 PROCEDURE — 74011250636 HC RX REV CODE- 250/636: Performed by: EMERGENCY MEDICINE

## 2023-02-23 PROCEDURE — 36415 COLL VENOUS BLD VENIPUNCTURE: CPT

## 2023-02-23 PROCEDURE — 80053 COMPREHEN METABOLIC PANEL: CPT

## 2023-02-23 PROCEDURE — 96365 THER/PROPH/DIAG IV INF INIT: CPT

## 2023-02-23 PROCEDURE — 85025 COMPLETE CBC W/AUTO DIFF WBC: CPT

## 2023-02-23 PROCEDURE — 96366 THER/PROPH/DIAG IV INF ADDON: CPT

## 2023-02-23 PROCEDURE — 99284 EMERGENCY DEPT VISIT MOD MDM: CPT

## 2023-02-23 PROCEDURE — 74011250637 HC RX REV CODE- 250/637: Performed by: EMERGENCY MEDICINE

## 2023-02-23 RX ORDER — POTASSIUM CHLORIDE 20 MEQ/1
40 TABLET, EXTENDED RELEASE ORAL ONCE
Status: COMPLETED | OUTPATIENT
Start: 2023-02-23 | End: 2023-02-23

## 2023-02-23 RX ORDER — POTASSIUM CHLORIDE 7.45 MG/ML
10 INJECTION INTRAVENOUS ONCE
Status: COMPLETED | OUTPATIENT
Start: 2023-02-23 | End: 2023-02-23

## 2023-02-23 RX ORDER — SODIUM CHLORIDE 9 MG/ML
50 INJECTION, SOLUTION INTRAVENOUS CONTINUOUS
Status: DISCONTINUED | OUTPATIENT
Start: 2023-02-23 | End: 2023-02-23

## 2023-02-23 RX ADMIN — POTASSIUM CHLORIDE 10 MEQ: 7.46 INJECTION, SOLUTION INTRAVENOUS at 19:53

## 2023-02-23 RX ADMIN — POTASSIUM CHLORIDE 10 MEQ: 7.46 INJECTION, SOLUTION INTRAVENOUS at 21:40

## 2023-02-23 RX ADMIN — POTASSIUM CHLORIDE 40 MEQ: 1500 TABLET, EXTENDED RELEASE ORAL at 19:34

## 2023-02-23 RX ADMIN — SODIUM CHLORIDE 50 ML/HR: 9 INJECTION, SOLUTION INTRAVENOUS at 19:54

## 2023-02-23 NOTE — ED TRIAGE NOTES
Pt has been having dark loose stools for 2 days. Yesterday she had regular colored loose stool and she took some pepto. She then started having dark stools again.   Feeling weak

## 2023-02-24 NOTE — ED PROVIDER NOTES
EMERGENCY DEPARTMENT HISTORY AND PHYSICAL EXAM      Date: 2/23/2023  Patient Name: Vianey Mercedes    History of Presenting Illness     Chief Complaint   Patient presents with    Diarrhea       History Provided By: Patient daughter    HPI: Vianey Mercedes, 79 y.o. female   presents to the ED with cc of diarrhea. Patient complains of intermittent episode of watery diarrhea for last several days. Patient states that she took a dose of Pepto-Bismol yesterday with improvement of diarrhea. Patient states that she saw dark stool today. No bright red blood per rectum. Patient states that she had a very mild intermittent episode of abdominal cramping that is relieved after having diarrhea. Daughter states that they ate the same food in a restaurant before the onset of the symptoms and daughter has similar symptoms. No vomiting. No fever chills. No URI symptoms. PCP: Cruzito Sandoval MD    No current facility-administered medications on file prior to encounter. Current Outpatient Medications on File Prior to Encounter   Medication Sig Dispense Refill    cetirizine (ZYRTEC) 10 mg tablet Take 1 Tablet by mouth daily. 90 Tablet 1    chlorthalidone (HYGROTON) 25 mg tablet Take 1 Tablet by mouth daily. aspirin delayed-release 81 mg tablet Take 81 mg by mouth daily. isosorbide mononitrate (ISMO, MONOKET) 20 mg tablet Take 60 mg by mouth in the morning. amLODIPine (NORVASC) 10 mg tablet Take 10 mg by mouth daily. atorvastatin (LIPITOR) 20 mg tablet Take 20 mg by mouth daily. carvediloL (COREG) 25 mg tablet Take 25 mg by mouth two (2) times daily (with meals). Lactobacillus acidophilus (PROBIOTIC PO) Take 1 Cap by mouth daily. co-enzyme Q-10 (CO Q-10) 100 mg capsule Take 100 mg by mouth daily. COLLAGEN 1 Cap by Does Not Apply route daily. multivitamin with minerals (VANESSA MULTIPLE/CHELATED MINERAL PO) Take 1 Cap by mouth daily.       cholecalciferol (VITAMIN D3) 25 mcg (1,000 unit) cap Take 1,000 Units by mouth daily. (Patient not taking: No sig reported)      nitroglycerin (NITROSTAT) 0.3 mg SL tablet 0.4 mg by SubLINGual route every five (5) minutes as needed for Chest Pain. Past History     Past Medical History:  Past Medical History:   Diagnosis Date    Angina at rest [facility])     Environmental and seasonal allergies     High cholesterol     History of heart attack     Hypercholesteremia     Hypertension     Pacemaker     Thrombocytopenia ([address]        Past Surgical History:  Past Surgical History:   Procedure Laterality Date    HX ANGIOPLASTY      HX CATARACT REMOVAL      HX PACEMAKER      HX TUBAL LIGATION         Family History:  Family History   Problem Relation Age of Onset    Hypertension Mother     Mult Sclerosis Brother     Cancer Maternal Grandmother     Hypertension Maternal Grandmother        Social History:  Social History     Tobacco Use    Smoking status: Former    Smokeless tobacco: Never   Vaping Use    Vaping Use: Never used   Substance Use Topics    Alcohol use: Not Currently    Drug use: Not Currently       Allergies: Allergies   Allergen Reactions    Latex Unknown (comments)    Augmentin [Amoxicillin-Pot Clavulanate] Swelling    Ciprofloxacin Rash    Epi E-Z Pen Unknown (comments)    Codeine Nausea Only         Review of Systems   Review of Systems   Constitutional:  Negative for chills and fever. HENT:  Negative for rhinorrhea and sore throat. Eyes:  Negative for discharge. Respiratory:  Negative for shortness of breath. Cardiovascular:  Negative for chest pain. Gastrointestinal:  Negative for blood in stool and vomiting. Genitourinary:  Negative for dysuria. Musculoskeletal:  Negative for joint swelling. Skin:  Negative for rash. Neurological:  Negative for headaches. Psychiatric/Behavioral:  Negative for suicidal ideas. All other systems reviewed and are negative. Physical Exam   Physical Exam  Vitals and nursing note reviewed. Constitutional:       General: She is not in acute distress. Appearance: Normal appearance. She is well-developed. She is not ill-appearing or toxic-appearing. HENT:      Head: Normocephalic and atraumatic. Nose: No congestion. Mouth/Throat:      Mouth: Mucous membranes are moist.   Eyes:      Conjunctiva/sclera: Conjunctivae normal.   Cardiovascular:      Rate and Rhythm: Regular rhythm. Heart sounds: Normal heart sounds. Pulmonary:      Effort: Pulmonary effort is normal.      Breath sounds: Normal breath sounds. Abdominal:      General: Bowel sounds are normal. There is no distension. Palpations: Abdomen is soft. Tenderness: There is no abdominal tenderness. There is no guarding or rebound. Musculoskeletal:         General: No deformity. Cervical back: Neck supple. Right lower leg: No edema. Left lower leg: No edema. Skin:     General: Skin is warm and dry. Neurological:      General: No focal deficit present. Mental Status: She is alert. Psychiatric:         Mood and Affect: Mood normal.       Diagnostic Study Results     Labs -     Recent Results (from the past 12 hour(s))   METABOLIC PANEL, COMPREHENSIVE    Collection Time: 02/23/23  6:55 PM   Result Value Ref Range    Sodium 139 136 - 145 mmol/L    Potassium 2.8 (L) 3.5 - 5.1 mmol/L    Chloride 100 97 - 108 mmol/L    CO2 34 (H) 21 - 32 mmol/L    Anion gap 5 5 - 15 mmol/L    Glucose 117 (H) 65 - 100 mg/dL    BUN 14 6 - 20 mg/dL    Creatinine 0.91 0.55 - 1.02 mg/dL    BUN/Creatinine ratio 15 12 - 20      eGFR >60 >60 ml/min/1.73m2    Calcium 8.9 8.5 - 10.1 mg/dL    Bilirubin, total 0.7 0.2 - 1.0 mg/dL    AST (SGOT) 31 15 - 37 U/L    ALT (SGPT) 24 12 - 78 U/L    Alk.  phosphatase 48 45 - 117 U/L    Protein, total 6.9 6.4 - 8.2 g/dL    Albumin 3.5 3.5 - 5.0 g/dL    Globulin 3.4 2.0 - 4.0 g/dL    A-G Ratio 1.0 (L) 1.1 - 2.2     CBC WITH AUTOMATED DIFF    Collection Time: 02/23/23  6:55 PM   Result Value Ref Range    WBC 5.2 3.6 - 11.0 K/uL    RBC 4.09 3.80 - 5.20 M/uL    HGB 12.6 11.5 - 16.0 g/dL    HCT 39.0 35.0 - 47.0 %    MCV 95.4 80.0 - 99.0 FL    MCH 30.8 26.0 - 34.0 PG    MCHC 32.3 30.0 - 36.5 g/dL    RDW 14.3 11.5 - 14.5 %    PLATELET 84 (L) 562 - 400 K/uL    NEUTROPHILS 67 32 - 75 %    LYMPHOCYTES 19 12 - 49 %    MONOCYTES 10 5 - 13 %    EOSINOPHILS 3 0 - 7 %    BASOPHILS 1 0 - 1 %    IMMATURE GRANULOCYTES 0 0.0 - 0.5 %    ABS. NEUTROPHILS 3.5 1.8 - 8.0 K/UL    ABS. LYMPHOCYTES 1.0 0.8 - 3.5 K/UL    ABS. MONOCYTES 0.5 0.0 - 1.0 K/UL    ABS. EOSINOPHILS 0.2 0.0 - 0.4 K/UL    ABS. BASOPHILS 0.0 0.0 - 0.1 K/UL    ABS. IMM. GRANS. 0.0 0.00 - 0.04 K/UL    DF AUTOMATED         Radiologic Studies -   No orders to display     CT Results  (Last 48 hours)      None          CXR Results  (Last 48 hours)      None              Medical Decision Making   I am the first provider for this patient. I reviewed the vital signs, available nursing notes, past medical history, past surgical history, family history and social history. Vital Signs-Reviewed the patient's vital signs. Patient Vitals for the past 12 hrs:   Temp Pulse Resp BP SpO2   02/23/23 2310 -- 60 16 (!) 153/70 99 %   02/23/23 2043 -- 60 16 (!) 151/65 100 %   02/23/23 1824 98.7 °F (37.1 °C) 77 16 (!) 145/77 99 %       Records Reviewed:     Provider Notes (Medical Decision Making):   Patient present with complaint of intermittent episode of watery diarrhea for last several days. Patient also complains of dark stool that occur after taking Pepto-Bismol. Clinically patient is nontoxic-appearing well-hydrated. Abdomen is soft nontender nonsurgical.  No clinical indication for radiographic study of the abdomen. Labs were significant for potassium of 2.8. CBC were within normal limits. Patient was given p.o. and IV potassium supplement and discharged in stable condition. No recurrence of diarrhea during the stay emergency room.   No clinical concern for GI bleeding. Patient was discharged with instruction for symptomatic treatment of diarrhea and dietary instruction and follow-up with the primary for persistent diarrhea. ED Course:   Initial assessment performed. The patients presenting problems have been discussed, and they are in agreement with the care plan formulated and outlined with them. I have encouraged them to ask questions as they arise throughout their visit. Abdomen soft nontender. No vomiting. No episode of diarrhea. PROCEDURES      Disposition: Condition stable   DC- Adult Discharges: All of the diagnostic tests were reviewed and questions answered. Diagnosis, care plan and treatment options were discussed. understand instructions and will follow up as directed. The patients results have been reviewed with them. They have been counseled regarding their diagnosis. The patient verbally convey understanding and agreement of the signs, symptoms, diagnosis, treatment and prognosis and additionally agrees to follow up as recommended. They also agree with the care-plan and convey that all of their questions have been answered. I have also put together some discharge instructions for them that include: 1) educational information regarding their diagnosis, 2) how to care for their diagnosis at home, as well a 3) list of reasons why they would want to return to the ED prior to their follow-up appointment, should their condition change. PLAN:  1. Current Discharge Medication List        2. Follow-up Information       Follow up With Specialties Details Why Contact Info    Follow up with your primary care physician  Schedule an appointment as soon as possible for a visit in 3 days As needed           Return to ED if worse     Diagnosis     Clinical Impression:   1. Diarrhea, unspecified type    2. Hypokalemia        Please note that this dictation was completed with [employer], the computer voice recognition software.   Quite often unanticipated grammatical, syntax, homophones, and other interpretive errors are inadvertently transcribed by the computer software. Please disregard these errors. Please excuse any errors that have escaped final proofreading. Thank you.

## 2023-08-24 ENCOUNTER — TELEPHONE (OUTPATIENT)
Facility: CLINIC | Age: 83
End: 2023-08-24

## 2023-08-25 ENCOUNTER — HOSPITAL ENCOUNTER (EMERGENCY)
Facility: HOSPITAL | Age: 83
Discharge: HOME | End: 2023-08-25
Attending: FAMILY MEDICINE
Payer: MEDICARE

## 2023-08-25 VITALS
SYSTOLIC BLOOD PRESSURE: 174 MMHG | HEIGHT: 67 IN | DIASTOLIC BLOOD PRESSURE: 74 MMHG | HEART RATE: 62 BPM | WEIGHT: 160 LBS | RESPIRATION RATE: 18 BRPM | BODY MASS INDEX: 25.11 KG/M2 | TEMPERATURE: 98 F | OXYGEN SATURATION: 100 %

## 2023-08-25 DIAGNOSIS — N30.91 CYSTITIS WITH HEMATURIA: Primary | ICD-10-CM

## 2023-08-25 LAB
APPEARANCE UR: ABNORMAL
BACTERIA URNS QL MICRO: ABNORMAL /HPF
BILIRUB UR QL: NEGATIVE
COLOR UR: YELLOW
EPITH CASTS URNS QL MICRO: ABNORMAL /LPF
GLUCOSE UR STRIP.AUTO-MCNC: NEGATIVE MG/DL
HGB UR QL STRIP: ABNORMAL
KETONES UR QL STRIP.AUTO: NEGATIVE MG/DL
LEUKOCYTE ESTERASE UR QL STRIP.AUTO: ABNORMAL
NITRITE UR QL STRIP.AUTO: NEGATIVE
PH UR STRIP: 6.5 (ref 5–8)
PROT UR STRIP-MCNC: NEGATIVE MG/DL
RBC #/AREA URNS HPF: ABNORMAL /HPF (ref 0–5)
SP GR UR REFRACTOMETRY: 1 (ref 1–1.03)
URINE CULTURE IF INDICATED: ABNORMAL
UROBILINOGEN UR QL STRIP.AUTO: 0.1 EU/DL (ref 0.2–1)
WBC URNS QL MICRO: ABNORMAL /HPF (ref 0–4)

## 2023-08-25 PROCEDURE — 87086 URINE CULTURE/COLONY COUNT: CPT

## 2023-08-25 PROCEDURE — 87077 CULTURE AEROBIC IDENTIFY: CPT

## 2023-08-25 PROCEDURE — 81001 URINALYSIS AUTO W/SCOPE: CPT

## 2023-08-25 PROCEDURE — 99283 EMERGENCY DEPT VISIT LOW MDM: CPT

## 2023-08-25 PROCEDURE — 87186 SC STD MICRODIL/AGAR DIL: CPT

## 2023-08-25 RX ORDER — NITROFURANTOIN 25; 75 MG/1; MG/1
100 CAPSULE ORAL 2 TIMES DAILY
Qty: 10 CAPSULE | Refills: 0 | Status: SHIPPED | OUTPATIENT
Start: 2023-08-25 | End: 2023-08-30

## 2023-08-25 ASSESSMENT — PAIN - FUNCTIONAL ASSESSMENT: PAIN_FUNCTIONAL_ASSESSMENT: NONE - DENIES PAIN

## 2023-08-25 NOTE — ED NOTES
VSS, A&Ox4, not in any acute distress.  AVS handed to patient, patient safely ambulated to personal vehicle     Aubrey Jennings RN  08/25/23 [id number]

## 2023-08-25 NOTE — ED TRIAGE NOTES
PT. TO ED WITH C/O  URINARY FREQUENCY FOR ABOUT ONE WEEK.  PT. CONCERNED ABOUT POTASSIUM, LAST K+ WAS 3.7 ON 8/22/23.

## 2023-08-25 NOTE — DISCHARGE INSTRUCTIONS
Thank you! Thank you for allowing me to care for you in the emergency department. It is my goal to provide you with excellent care. If you have not received excellent quality care, please ask to speak to the nurse manager. Please fill out the survey that will come to you by mail or email since we listen to your feedback! Below you will find a list of your tests from today's visit. Should you have any questions, please do not hesitate to call the emergency department. Labs  Recent Results (from the past 12 hour(s))   Urinalysis with Reflex to Culture    Collection Time: 08/25/23  4:05 PM    Specimen: Urine   Result Value Ref Range    Color, UA Yellow      Appearance Hazy (A) Clear      Specific Gravity, UA 1.005 1.003 - 1.030      pH, Urine 6.5 5.0 - 8.0      Protein, UA Negative Negative mg/dL    Glucose, UA Negative Negative mg/dL    Ketones, Urine Negative Negative mg/dL    Bilirubin Urine Negative Negative      Blood, Urine Small (A) Negative      Urobilinogen, Urine 0.1 (L) 0.2 - 1.0 EU/dL    Nitrite, Urine Negative Negative      Leukocyte Esterase, Urine Large (A) Negative      WBC, UA 10-20 0 - 4 /hpf    RBC, UA 0-5 0 - 5 /hpf    Epithelial Cells UA Few Few /lpf    BACTERIA, URINE 1+ (A) Negative /hpf    Urine Culture if Indicated Urine Culture Ordered (A) Culture not indicated by UA result         Radiologic Studies  No orders to display     ------------------------------------------------------------------------------------------------------------  The exam and treatment you received in the Emergency Department were for an urgent problem and are not intended as complete care. It is important that you follow-up with a doctor, nurse practitioner, or physician assistant to:  (1) confirm your diagnosis,  (2) re-evaluation of changes in your illness and treatment, and  (3) for ongoing care. Please take your discharge instructions with you when you go to your follow-up appointment.      If you have

## 2023-08-25 NOTE — ED PROVIDER NOTES
EMERGENCY DEPARTMENT HISTORY AND PHYSICAL EXAM      Date: 8/25/2023  Patient Name: Daniel Bryan    History of Presenting Illness         History Provided By:     HPI: Daniel Bryan, is an extremely pleasant 79 y.o. female presenting to the ED with a chief complaint of urinary frequency. patient endorses recent onset of symptoms. No alleviating nor aggravating factors. No fevers, chills nor rigors. No flank pain. Patient denies any other associated symptoms. She would also like to have her potassium rechecked because it was 3.8 several days ago. There are no other complaints, changes, or physical findings at this time. PCP: Lester Hart MD    No current facility-administered medications on file prior to encounter.      Current Outpatient Medications on File Prior to Encounter   Medication Sig Dispense Refill    amLODIPine (NORVASC) 10 MG tablet Take 1 tablet by mouth daily      aspirin 81 MG EC tablet Take 1 tablet by mouth daily      atorvastatin (LIPITOR) 20 MG tablet Take 1 tablet by mouth daily      carvedilol (COREG) 25 MG tablet Take 1 tablet by mouth 2 times daily (with meals)      cetirizine (ZYRTEC) 10 MG tablet Take 10 mg by mouth daily      chlorthalidone (HYGROTON) 25 MG tablet Take 1 tablet by mouth daily      vitamin D 25 MCG (1000 UT) CAPS Take 1 capsule by mouth daily      coenzyme Q10 100 MG CAPS capsule Take 1 capsule by mouth daily      isosorbide mononitrate (ISMO;MONOKET) 20 MG tablet Take 3 tablets by mouth daily      nitroGLYCERIN (NITROSTAT) 0.3 MG SL tablet Place 0.4 mg under the tongue         Past History     Past Medical History:  Past Medical History:   Diagnosis Date    Angina at rest [facility])     Environmental and seasonal allergies     High cholesterol     History of heart attack     Hypercholesteremia     Hypertension     Pacemaker     Thrombocytopenia ([address])        Past Surgical History:  Past Surgical History:   Procedure Laterality Date    ANGIOPLASTY aspirin 81 MG EC tablet Take 1 tablet by mouth daily      atorvastatin (LIPITOR) 20 MG tablet Take 1 tablet by mouth daily      carvedilol (COREG) 25 MG tablet Take 1 tablet by mouth 2 times daily (with meals)      cetirizine (ZYRTEC) 10 MG tablet Take 10 mg by mouth daily      chlorthalidone (HYGROTON) 25 MG tablet Take 1 tablet by mouth daily      vitamin D 25 MCG (1000 UT) CAPS Take 1 capsule by mouth daily      coenzyme Q10 100 MG CAPS capsule Take 1 capsule by mouth daily      isosorbide mononitrate (ISMO;MONOKET) 20 MG tablet Take 3 tablets by mouth daily      nitroGLYCERIN (NITROSTAT) 0.3 MG SL tablet Place 0.4 mg under the tongue               2. [unfilled]      3.  Return to ED if worse       4. Medication List        START taking these medications      nitrofurantoin (macrocrystal-monohydrate) 100 MG capsule  Commonly known as: Macrobid  Take 1 capsule by mouth 2 times daily for 5 days            ASK your doctor about these medications      amLODIPine 10 MG tablet  Commonly known as: NORVASC     aspirin 81 MG EC tablet     atorvastatin 20 MG tablet  Commonly known as: LIPITOR     carvedilol 25 MG tablet  Commonly known as: COREG     cetirizine 10 MG tablet  Commonly known as: ZYRTEC     chlorthalidone 25 MG tablet  Commonly known as: HYGROTON     coenzyme Q10 100 MG Caps capsule     isosorbide mononitrate 20 MG tablet  Commonly known as: ISMO;MONOKET     nitroGLYCERIN 0.3 MG SL tablet  Commonly known as: NITROSTAT     vitamin D 25 MCG (1000 UT) Caps               Where to Get Your Medications        These medications were sent to [facility] - [address] [phone] - F [phone]  [place] [address], [place] [id number] [facility]      Phone: [phone]   nitrofurantoin (macrocrystal-monohydrate) 100 MG capsule           Diagnosis     Clinical Impression:    1.  Cystitis with hematuria        Attestations:    Jose Shown, DO    Please note that this dictation was completed

## 2023-08-27 LAB
BACTERIA SPEC CULT: ABNORMAL
COLONY COUNT, CNT: ABNORMAL
COLONY COUNT, CNT: ABNORMAL
Lab: ABNORMAL

## 2023-08-28 DIAGNOSIS — R73.03 PREDIABETES: Primary | ICD-10-CM

## 2023-08-28 DIAGNOSIS — R79.0 LOW BLOOD MAGNESIUM: ICD-10-CM

## 2023-08-28 DIAGNOSIS — E78.00 HYPERCHOLESTEREMIA: ICD-10-CM

## 2023-08-28 DIAGNOSIS — I10 BENIGN ESSENTIAL HTN: ICD-10-CM

## 2023-08-28 DIAGNOSIS — D69.6 THROMBOCYTOPENIA (HCC): ICD-10-CM

## 2023-08-28 LAB
BACTERIA SPEC CULT: ABNORMAL
COLONY COUNT, CNT: ABNORMAL
COLONY COUNT, CNT: ABNORMAL
Lab: ABNORMAL

## 2023-08-29 NOTE — RESULT ENCOUNTER NOTE
Culture showed positive for Citrobacter koseri. [facility] Sensitive based upon culture results.

## 2023-09-15 ENCOUNTER — NURSE TRIAGE (OUTPATIENT)
Dept: OTHER | Facility: CLINIC | Age: 83
End: 2023-09-15

## 2023-09-15 NOTE — TELEPHONE ENCOUNTER
[place] [facility]    Received call from Georgette Reno at [facility] with [employer] Subjective: Caller states \"lightheadedness\"     Current Symptoms: head is stopped up, went to [facility] at the first of the month, did CT scan, didn't find anything. She had labs too. She said this happens all the time, has all her life. Sinus pressure that makes her feel lightheaded, blurred vision at times. Ears feels clogged at times. Has HTN. Checks it daily, and takes meds as prescribed. Onset: 1 month ago; unchanged, worse with time as it is happening more frequently    Associated Symptoms: NA    Pain Severity: 0/10; pressure; intermittent    Temperature: denies fever     What has been tried: zyrtec, steam, neti pot, saline spray, allergy medication    LMP: NA Pregnant: NA    Recommended disposition: See PCP within 24 Hours, patient says she is waiting on blood work from an outside facility to be sent to her PCP office, so she is requesting an appointment at the end of next week. Care advice provided, patient verbalizes understanding; denies any other questions or concerns; instructed to call back for any new or worsening symptoms. Writer provided warm transfer to [employer] at [facility] for appointment  scheduling    Attention Provider: Thank you for allowing me to participate in the care of your patient. The patient was connected to triage in response to information provided to the ECC/PSC. Please do not respond through this encounter as the response is not directed to a shared pool.       Reason for Disposition   Taking a medicine that could cause dizziness (e.g., blood pressure medications, diuretics)    Protocols used: Dizziness - Lightheadedness-ADULT-

## 2023-09-15 NOTE — TELEPHONE ENCOUNTER
Received call from Curt Still at [facility], caller got disconnected when ECC agent hung up. Complaint: Lightheaded and sinus's. Practice Name: [facility]    Caller's telephone number was not verified prior to [facility] agent disconnecting, number on file is   [phone]     Unsuccessful attempt to re-connect with caller via phone, left message for return call to office    Location of patient: NA      Attention Provider: Thank you for allowing me to participate in the care of your patient. The patient was connected to triage in response to information provided to the ECC/PSC. Please do not respond through this encounter as the response is not directed to a shared pool.       Reason for Disposition   Message left on identified voicemail    Protocols used: No Contact or Duplicate Contact Call-ADULT-[place]

## 2023-09-20 ENCOUNTER — OFFICE VISIT (OUTPATIENT)
Facility: CLINIC | Age: 83
End: 2023-09-20
Payer: MEDICARE

## 2023-09-20 VITALS
HEIGHT: 65 IN | HEART RATE: 60 BPM | DIASTOLIC BLOOD PRESSURE: 66 MMHG | RESPIRATION RATE: 16 BRPM | TEMPERATURE: 98.4 F | WEIGHT: 149 LBS | SYSTOLIC BLOOD PRESSURE: 144 MMHG | BODY MASS INDEX: 24.83 KG/M2 | OXYGEN SATURATION: 99 %

## 2023-09-20 DIAGNOSIS — I65.23 CAROTID STENOSIS, ASYMPTOMATIC, BILATERAL: ICD-10-CM

## 2023-09-20 DIAGNOSIS — D69.6 THROMBOCYTOPENIA, UNSPECIFIED (HCC): ICD-10-CM

## 2023-09-20 DIAGNOSIS — E78.00 PURE HYPERCHOLESTEROLEMIA, UNSPECIFIED: ICD-10-CM

## 2023-09-20 DIAGNOSIS — Z95.0 PRESENCE OF CARDIAC PACEMAKER: ICD-10-CM

## 2023-09-20 DIAGNOSIS — H81.90 VESTIBULAR DIZZINESS: ICD-10-CM

## 2023-09-20 DIAGNOSIS — J30.89 ENVIRONMENTAL AND SEASONAL ALLERGIES: ICD-10-CM

## 2023-09-20 DIAGNOSIS — I10 ESSENTIAL HYPERTENSION, BENIGN: Primary | ICD-10-CM

## 2023-09-20 PROCEDURE — G8400 PT W/DXA NO RESULTS DOC: HCPCS | Performed by: INTERNAL MEDICINE

## 2023-09-20 PROCEDURE — 1036F TOBACCO NON-USER: CPT | Performed by: INTERNAL MEDICINE

## 2023-09-20 PROCEDURE — G8427 DOCREV CUR MEDS BY ELIG CLIN: HCPCS | Performed by: INTERNAL MEDICINE

## 2023-09-20 PROCEDURE — 1090F PRES/ABSN URINE INCON ASSESS: CPT | Performed by: INTERNAL MEDICINE

## 2023-09-20 PROCEDURE — G8420 CALC BMI NORM PARAMETERS: HCPCS | Performed by: INTERNAL MEDICINE

## 2023-09-20 PROCEDURE — 3078F DIAST BP <80 MM HG: CPT | Performed by: INTERNAL MEDICINE

## 2023-09-20 PROCEDURE — 99214 OFFICE O/P EST MOD 30 MIN: CPT | Performed by: INTERNAL MEDICINE

## 2023-09-20 PROCEDURE — 3077F SYST BP >= 140 MM HG: CPT | Performed by: INTERNAL MEDICINE

## 2023-09-20 PROCEDURE — 1123F ACP DISCUSS/DSCN MKR DOCD: CPT | Performed by: INTERNAL MEDICINE

## 2023-09-20 RX ORDER — LEVOCETIRIZINE DIHYDROCHLORIDE 5 MG/1
5 TABLET, FILM COATED ORAL NIGHTLY
Qty: 90 TABLET | Refills: 1 | Status: SHIPPED | OUTPATIENT
Start: 2023-09-20

## 2023-09-20 RX ORDER — FLUTICASONE PROPIONATE 50 MCG
2 SPRAY, SUSPENSION (ML) NASAL DAILY
Qty: 16 G | Refills: 1 | Status: SHIPPED | OUTPATIENT
Start: 2023-09-20

## 2023-09-20 SDOH — ECONOMIC STABILITY: FOOD INSECURITY: WITHIN THE PAST 12 MONTHS, YOU WORRIED THAT YOUR FOOD WOULD RUN OUT BEFORE YOU GOT MONEY TO BUY MORE.: NEVER TRUE

## 2023-09-20 SDOH — ECONOMIC STABILITY: INCOME INSECURITY: HOW HARD IS IT FOR YOU TO PAY FOR THE VERY BASICS LIKE FOOD, HOUSING, MEDICAL CARE, AND HEATING?: NOT VERY HARD

## 2023-09-20 SDOH — ECONOMIC STABILITY: HOUSING INSECURITY
IN THE LAST 12 MONTHS, WAS THERE A TIME WHEN YOU DID NOT HAVE A STEADY PLACE TO SLEEP OR SLEPT IN A SHELTER (INCLUDING NOW)?: NO

## 2023-09-20 SDOH — ECONOMIC STABILITY: FOOD INSECURITY: WITHIN THE PAST 12 MONTHS, THE FOOD YOU BOUGHT JUST DIDN'T LAST AND YOU DIDN'T HAVE MONEY TO GET MORE.: NEVER TRUE

## 2023-09-20 ASSESSMENT — ENCOUNTER SYMPTOMS
SHORTNESS OF BREATH: 0
DIARRHEA: 0
COUGH: 0
NAUSEA: 0
VOMITING: 0
ABDOMINAL PAIN: 0

## 2023-09-20 ASSESSMENT — PATIENT HEALTH QUESTIONNAIRE - PHQ9
2. FEELING DOWN, DEPRESSED OR HOPELESS: 0
SUM OF ALL RESPONSES TO PHQ QUESTIONS 1-9: 0
SUM OF ALL RESPONSES TO PHQ9 QUESTIONS 1 & 2: 0
1. LITTLE INTEREST OR PLEASURE IN DOING THINGS: 0
SUM OF ALL RESPONSES TO PHQ QUESTIONS 1-9: 0

## 2023-09-20 NOTE — PROGRESS NOTES
Chief Complaint   Patient presents with    Dizziness         1. \"Have you been to the ER, urgent care clinic since your last visit? Hospitalized since your last visit? \" Yes Where: [facility] as well as UC in [address] [place] [address]  and ER    2. \"Have you seen or consulted any other health care providers outside of the [address] [place] [address] since your last visit? \" No     3. For patients aged 43-73: Has the patient had a colonoscopy / FIT/ Cologuard? NA - based on age    If the patient is female:    4. For patients aged 43-66: Has the patient had a mammogram within the past 2 years? NA - based on age or sex      11. For patients aged 21-65: Has the patient had a pap smear?  NA - based on age or sex
hematologist to see  3. Vestibular dizziness  Comments:  Seems like patient has vestibular dizziness. We will refer her to ENT. May benefit from vestibular exercise. Also may need allergy testing  Orders:  -     Lauren Gonzalez MD, Otolaryngology, [facility]  4. Carotid stenosis, asymptomatic, bilateral  Comments:  Patient has 50 to 79% stenosis of the carotids. Advised to get back to cardiologist to see whether she needs vascular surgery consult  5. Presence of cardiac pacemaker  Comments:  Seems doing okay. 6. Pure hypercholesterolemia, unspecified  Comments:  Continue current medications. 7. Environmental and seasonal allergies  Comments: We will change her allergy medicine to xyzal.  Continue Flonase. May need allergy testing. Orders:  -     levocetirizine (XYZAL ALLERGY 24HR) 5 MG tablet; Take 1 tablet by mouth nightly, Disp-90 tablet, R-1Normal  -     fluticasone (FLONASE) 50 MCG/ACT nasal spray; 2 sprays by Each Nostril route daily, Disp-16 g, R-1Normal      Return in about 3 months (around 12/20/2023) for follow up. Patient Instructions   Advised flu shot,RSV and Covid booster . To stay well-hydrated. Health Maintenance Due   Topic Date Due    DTaP/Tdap/Td vaccine (1 - Tdap) Never done    DEXA (modify frequency per FRAX score)  Never done    Shingles vaccine (2 of 3) 12/04/2012    Pneumococcal 65+ years Vaccine (2 - PCV) 11/06/2015    COVID-19 Vaccine (3 - Alondra Dikes series) 06/16/2021    Annual Wellness Visit (AWV)  Never done    Flu vaccine (1) Never done        Aspects of this note may have been generated using voice recognition software. Despite editing, there may be unrecognized errors. An electronic signature was used to authenticate this note.   -- Enoch Reinoso MD

## 2023-10-23 ENCOUNTER — OFFICE VISIT (OUTPATIENT)
Age: 83
End: 2023-10-23
Payer: MEDICARE

## 2023-10-23 VITALS
BODY MASS INDEX: 24.49 KG/M2 | SYSTOLIC BLOOD PRESSURE: 112 MMHG | HEART RATE: 59 BPM | OXYGEN SATURATION: 97 % | HEIGHT: 65 IN | WEIGHT: 147 LBS | DIASTOLIC BLOOD PRESSURE: 56 MMHG | RESPIRATION RATE: 16 BRPM

## 2023-10-23 DIAGNOSIS — J30.9 CHRONIC ALLERGIC RHINITIS: Primary | ICD-10-CM

## 2023-10-23 PROCEDURE — 3074F SYST BP LT 130 MM HG: CPT | Performed by: NURSE PRACTITIONER

## 2023-10-23 PROCEDURE — 3078F DIAST BP <80 MM HG: CPT | Performed by: NURSE PRACTITIONER

## 2023-10-23 PROCEDURE — 1123F ACP DISCUSS/DSCN MKR DOCD: CPT | Performed by: NURSE PRACTITIONER

## 2023-10-23 PROCEDURE — G8484 FLU IMMUNIZE NO ADMIN: HCPCS | Performed by: NURSE PRACTITIONER

## 2023-10-23 PROCEDURE — G8427 DOCREV CUR MEDS BY ELIG CLIN: HCPCS | Performed by: NURSE PRACTITIONER

## 2023-10-23 PROCEDURE — 1036F TOBACCO NON-USER: CPT | Performed by: NURSE PRACTITIONER

## 2023-10-23 PROCEDURE — G8420 CALC BMI NORM PARAMETERS: HCPCS | Performed by: NURSE PRACTITIONER

## 2023-10-23 PROCEDURE — G8400 PT W/DXA NO RESULTS DOC: HCPCS | Performed by: NURSE PRACTITIONER

## 2023-10-23 PROCEDURE — 1090F PRES/ABSN URINE INCON ASSESS: CPT | Performed by: NURSE PRACTITIONER

## 2023-10-23 PROCEDURE — 99203 OFFICE O/P NEW LOW 30 MIN: CPT | Performed by: NURSE PRACTITIONER

## 2023-10-23 RX ORDER — IPRATROPIUM BROMIDE 21 UG/1
2 SPRAY, METERED NASAL EVERY 12 HOURS
Qty: 30 ML | Refills: 3 | Status: SHIPPED | OUTPATIENT
Start: 2023-10-23

## 2023-10-23 ASSESSMENT — ENCOUNTER SYMPTOMS
SINUS PRESSURE: 1
EYES NEGATIVE: 1
COUGH: 1
RHINORRHEA: 1
GASTROINTESTINAL NEGATIVE: 1

## 2023-10-23 NOTE — PROGRESS NOTES
Subjective:    Tiffanie Ordoñez   79 y.o.   1940     New Patient Visit  This is a 79 y.o. female who is here today to discuss issues with allergies. She states when her allergies will get bad she will have issues with dizziness. She states she has had issues with allergy medications raising her blood pressure. She states she is not currently dizzy. She has been on cetirizine, and states it has stopped working, her PCP changed her to levo cetirizine, however she states that this made her blood pressure increase as well. She has now gone back to her cetirizine and states he BP has stabilized. She states she will have increased pressure in the sinuses and her head will feel full. She states she has also noted some fogginess in her vision, and relates this to her allergy symptoms. Review of Systems  Review of Systems   Constitutional: Negative. HENT:  Positive for congestion, postnasal drip, rhinorrhea and sinus pressure. Dry mouth      Eyes: Negative. Respiratory:  Positive for cough. Cardiovascular: Negative. Gastrointestinal: Negative. Endocrine: Negative. Genitourinary: Negative. Musculoskeletal: Negative. Skin: Negative. Allergic/Immunologic: Positive for environmental allergies. Neurological: Negative. Hematological: Negative. Psychiatric/Behavioral: Negative.              Past Medical History:   Diagnosis Date    Angina at rest     Environmental and seasonal allergies     High cholesterol     History of heart attack     Hypercholesteremia     Hypertension     Pacemaker     Thrombocytopenia ([address])      Past Surgical History:   Procedure Laterality Date    ANGIOPLASTY      CATARACT REMOVAL      PACEMAKER      TUBAL LIGATION        Family History   Problem Relation Age of Onset    Hypertension Maternal Grandmother     Hypertension Mother     Mult Sclerosis Brother     Cancer Maternal Grandmother      Social History     Tobacco Use    Smoking status: Former

## 2023-11-07 ENCOUNTER — OFFICE VISIT (OUTPATIENT)
Age: 83
End: 2023-11-07
Payer: MEDICARE

## 2023-11-07 VITALS
BODY MASS INDEX: 24.49 KG/M2 | WEIGHT: 147 LBS | SYSTOLIC BLOOD PRESSURE: 136 MMHG | DIASTOLIC BLOOD PRESSURE: 68 MMHG | OXYGEN SATURATION: 98 % | HEIGHT: 65 IN | HEART RATE: 67 BPM

## 2023-11-07 DIAGNOSIS — J30.9 CHRONIC ALLERGIC RHINITIS: Primary | ICD-10-CM

## 2023-11-07 PROCEDURE — 1036F TOBACCO NON-USER: CPT | Performed by: NURSE PRACTITIONER

## 2023-11-07 PROCEDURE — 3075F SYST BP GE 130 - 139MM HG: CPT | Performed by: NURSE PRACTITIONER

## 2023-11-07 PROCEDURE — 3078F DIAST BP <80 MM HG: CPT | Performed by: NURSE PRACTITIONER

## 2023-11-07 PROCEDURE — G8427 DOCREV CUR MEDS BY ELIG CLIN: HCPCS | Performed by: NURSE PRACTITIONER

## 2023-11-07 PROCEDURE — G8400 PT W/DXA NO RESULTS DOC: HCPCS | Performed by: NURSE PRACTITIONER

## 2023-11-07 PROCEDURE — G8420 CALC BMI NORM PARAMETERS: HCPCS | Performed by: NURSE PRACTITIONER

## 2023-11-07 PROCEDURE — 99214 OFFICE O/P EST MOD 30 MIN: CPT | Performed by: NURSE PRACTITIONER

## 2023-11-07 PROCEDURE — G8484 FLU IMMUNIZE NO ADMIN: HCPCS | Performed by: NURSE PRACTITIONER

## 2023-11-07 PROCEDURE — 1123F ACP DISCUSS/DSCN MKR DOCD: CPT | Performed by: NURSE PRACTITIONER

## 2023-11-07 PROCEDURE — 1090F PRES/ABSN URINE INCON ASSESS: CPT | Performed by: NURSE PRACTITIONER

## 2023-11-07 RX ORDER — IPRATROPIUM BROMIDE 42 UG/1
2 SPRAY, METERED NASAL 4 TIMES DAILY
Qty: 15 ML | Refills: 3 | Status: SHIPPED | OUTPATIENT
Start: 2023-11-07

## 2023-11-07 ASSESSMENT — ENCOUNTER SYMPTOMS
COUGH: 1
GASTROINTESTINAL NEGATIVE: 1
EYES NEGATIVE: 1
RHINORRHEA: 1
SINUS PRESSURE: 1

## 2023-11-07 NOTE — PROGRESS NOTES
History:   Procedure Laterality Date    ANGIOPLASTY      CATARACT REMOVAL      PACEMAKER      TUBAL LIGATION        Family History   Problem Relation Age of Onset    Hypertension Maternal Grandmother     Hypertension Mother     Mult Sclerosis Brother     Cancer Maternal Grandmother      Social History     Tobacco Use    Smoking status: Former    Smokeless tobacco: Never   Substance Use Topics    Alcohol use: Not Currently      Prior to Admission medications    Medication Sig Start Date End Date Taking? Authorizing Provider   ipratropium (ATROVENT) 0.03 % nasal spray 2 sprays by Each Nostril route in the morning and 2 sprays in the evening.  10/23/23   Irma Walden APRN - CNP   levocetirizine Andre Ponce ALLERGY 24HR) 5 MG tablet Take 1 tablet by mouth nightly 9/20/23   Enoch Reinoso MD   fluticasone (FLONASE) 50 MCG/ACT nasal spray 2 sprays by Each Nostril route daily 9/20/23   Enoch Reinoso MD   amLODIPine (NORVASC) 10 MG tablet Take 1 tablet by mouth daily    Automatic Reconciliation, Ar   aspirin 81 MG EC tablet Take 1 tablet by mouth daily    Automatic Reconciliation, Ar   atorvastatin (LIPITOR) 20 MG tablet Take 1 tablet by mouth daily    Automatic Reconciliation, Ar   carvedilol (COREG) 25 MG tablet Take 1 tablet by mouth 2 times daily (with meals)    Automatic Reconciliation, Ar   cetirizine (ZYRTEC) 10 MG tablet Take 1 tablet by mouth daily 2/8/23   Automatic Reconciliation, Ar   chlorthalidone (HYGROTON) 25 MG tablet Take 1 tablet by mouth daily 8/18/22 8/18/23  Automatic Reconciliation, Ar   vitamin D 25 MCG (1000 UT) CAPS Take 1 capsule by mouth daily    Automatic Reconciliation, Ar   coenzyme Q10 100 MG CAPS capsule Take 1 capsule by mouth daily    Automatic Reconciliation, Ar   isosorbide mononitrate (ISMO;MONOKET) 20 MG tablet Take 3 tablets by mouth daily    Automatic Reconciliation, Ar   nitroGLYCERIN (NITROSTAT) 0.3 MG SL tablet Place 0.4 mg under the tongue    Automatic Reconciliation,

## 2023-12-11 ENCOUNTER — TELEPHONE (OUTPATIENT)
Age: 83
End: 2023-12-11

## 2023-12-11 NOTE — TELEPHONE ENCOUNTER
PC to patient, reviewed the allergy test needs to be cancelled due to patients medications,specifically her Beta Blocker that cannot be stopped per Cardiology. Pt states understanding.  Kirt

## 2024-02-22 ENCOUNTER — OFFICE VISIT (OUTPATIENT)
Facility: CLINIC | Age: 84
End: 2024-02-22

## 2024-02-22 VITALS
HEIGHT: 65 IN | DIASTOLIC BLOOD PRESSURE: 61 MMHG | WEIGHT: 151 LBS | RESPIRATION RATE: 16 BRPM | SYSTOLIC BLOOD PRESSURE: 137 MMHG | TEMPERATURE: 98 F | OXYGEN SATURATION: 99 % | BODY MASS INDEX: 25.16 KG/M2 | HEART RATE: 59 BPM

## 2024-02-22 DIAGNOSIS — Z95.0 PRESENCE OF CARDIAC PACEMAKER: ICD-10-CM

## 2024-02-22 DIAGNOSIS — E78.00 HYPERCHOLESTEREMIA: ICD-10-CM

## 2024-02-22 DIAGNOSIS — R73.09 ABNORMAL BLOOD SUGAR: ICD-10-CM

## 2024-02-22 DIAGNOSIS — I10 ESSENTIAL HYPERTENSION, BENIGN: Primary | ICD-10-CM

## 2024-02-22 DIAGNOSIS — D69.6 THROMBOCYTOPENIA, UNSPECIFIED (HCC): ICD-10-CM

## 2024-02-22 DIAGNOSIS — Z78.0 POST-MENOPAUSAL: ICD-10-CM

## 2024-02-22 DIAGNOSIS — J30.89 ENVIRONMENTAL AND SEASONAL ALLERGIES: ICD-10-CM

## 2024-02-22 PROBLEM — I49.9 CARDIAC ARRHYTHMIA: Status: ACTIVE | Noted: 2023-12-05

## 2024-02-22 PROBLEM — I49.5 SSS (SICK SINUS SYNDROME) (HCC): Status: ACTIVE | Noted: 2022-07-27

## 2024-02-22 PROBLEM — I25.118 CORONARY ARTERY DISEASE OF NATIVE ARTERY OF NATIVE HEART WITH STABLE ANGINA PECTORIS (HCC): Status: ACTIVE | Noted: 2022-07-27

## 2024-02-22 PROBLEM — H35.3112 NONEXUDATIVE AGE-RELATED MACULAR DEGENERATION, RIGHT EYE, INTERMEDIATE DRY STAGE: Status: ACTIVE | Noted: 2024-02-22

## 2024-02-22 PROBLEM — E87.6 HYPOKALEMIA: Status: ACTIVE | Noted: 2023-05-03

## 2024-02-22 PROBLEM — I65.23 CAROTID ATHEROSCLEROSIS, BILATERAL: Status: ACTIVE | Noted: 2023-05-03

## 2024-02-22 RX ORDER — CETIRIZINE HYDROCHLORIDE 10 MG/1
10 TABLET ORAL DAILY
Qty: 90 TABLET | Refills: 1 | Status: SHIPPED | OUTPATIENT
Start: 2024-02-22

## 2024-02-22 RX ORDER — POTASSIUM CHLORIDE 750 MG/1
10 CAPSULE, EXTENDED RELEASE ORAL DAILY
COMMUNITY

## 2024-02-22 ASSESSMENT — ENCOUNTER SYMPTOMS
COUGH: 0
VOMITING: 0
SHORTNESS OF BREATH: 0
DIARRHEA: 0
ABDOMINAL PAIN: 0
NAUSEA: 0

## 2024-02-22 NOTE — PROGRESS NOTES
[facility]  [address], [place] [address]  Phone: [phone]      Liliana Sheikh (: 1940) is a 83 y.o. female, established patient, here for evaluation of the following chief complaint(s):  Follow-up Chronic Condition         SUBJECTIVE/OBJECTIVE:  HPI:  This is a 83-year-old female with past medical history of hypertension, carotid stenosis, hypercholesterolemia, pacemaker, thrombocytopenia here for 5 months follow-up.  Patient states she has seen Dr. Orosco her cardiologist in January and has reduced amlodipine to 5 mg.  Patient has been to ENT and had some new drops placed and dizziness is better.  Wanted to have allergy testing done, but cardiologist didn't okay it She denies any chest pains or shortness of breath. She states appetite and energy are good. She states she sleeps pretty good. She states that she is compliant with all medications. She states that she needs a refill on cetirizine. She denies any ER or urgent care visits since last seen in office. She had Covid, flu and RSV vaccination in October.   Lab review on 2023 potassium was 3.8  Blood sugar was 110.  BUN 14.  Cre 1 3.  Calcium 9.4.  Total cholesterol was 116.  HDL 45.  LDL 56.  Triglycerides 70.  A1c was 5.6.  Magnesium was 2.1.  Dr. Tong has placed her on some potassium also.    Hemoglobin   Date Value Ref Range Status   2023 12.6 11.5 - 16.0 g/dL Final     Hematocrit   Date Value Ref Range Status   2023 39.0 35.0 - 47.0 % Final     Creatinine   Date Value Ref Range Status   2023 0.91 0.55 - 1.02 mg/dL Final     Glucose   Date Value Ref Range Status   2023 117 (H) 65 - 100 mg/dL Final     Potassium   Date Value Ref Range Status   2023 2.8 (L) 3.5 - 5.1 mmol/L Final       CT Result (most recent):  CT ABDOMEN PELVIS WO IV CONTRAST 2022    Narrative  INDICATION: Intermittent left-sided abdominal pain, microscopic hematuria.    Exam:

## 2024-02-22 NOTE — PROGRESS NOTES
1. \"Have you been to the ER, urgent care clinic since your last visit?  Hospitalized since your last visit?\" No    2. \"Have you seen or consulted any other health care providers outside of the [facility] since your last visit?\" No     3. For patients aged 45-75: Has the patient had a colonoscopy / FIT/ Cologuard? NA - based on age      If the patient is female:    4. For patients aged 40-74: Has the patient had a mammogram within the past 2 years? NA - based on age or sex      5. For patients aged 21-65: Has the patient had a pap smear? NA - based on age or sex

## 2024-03-07 ENCOUNTER — HOSPITAL ENCOUNTER (OUTPATIENT)
Facility: HOSPITAL | Age: 84
Discharge: HOME | End: 2024-03-07
Attending: INTERNAL MEDICINE
Payer: MEDICARE

## 2024-03-07 DIAGNOSIS — Z78.0 POST-MENOPAUSAL: ICD-10-CM

## 2024-03-07 PROCEDURE — 77080 DXA BONE DENSITY AXIAL: CPT

## 2024-05-30 ENCOUNTER — APPOINTMENT (OUTPATIENT)
Facility: HOSPITAL | Age: 84
End: 2024-05-30
Payer: MEDICARE

## 2024-05-30 ENCOUNTER — HOSPITAL ENCOUNTER (EMERGENCY)
Facility: HOSPITAL | Age: 84
Discharge: HOME | End: 2024-05-30
Attending: EMERGENCY MEDICINE
Payer: MEDICARE

## 2024-05-30 VITALS
HEART RATE: 60 BPM | BODY MASS INDEX: 24.11 KG/M2 | RESPIRATION RATE: 16 BRPM | HEIGHT: 66 IN | OXYGEN SATURATION: 99 % | SYSTOLIC BLOOD PRESSURE: 167 MMHG | WEIGHT: 150 LBS | TEMPERATURE: 97.7 F | DIASTOLIC BLOOD PRESSURE: 82 MMHG

## 2024-05-30 DIAGNOSIS — R00.2 PALPITATIONS: ICD-10-CM

## 2024-05-30 DIAGNOSIS — N30.00 ACUTE CYSTITIS WITHOUT HEMATURIA: Primary | ICD-10-CM

## 2024-05-30 LAB
ALBUMIN SERPL-MCNC: 3.9 G/DL (ref 3.5–5)
ALBUMIN/GLOB SERPL: 1.1 (ref 1.1–2.2)
ALP SERPL-CCNC: 63 U/L (ref 45–117)
ALT SERPL-CCNC: 31 U/L (ref 12–78)
ANION GAP SERPL CALC-SCNC: 6 MMOL/L (ref 5–15)
APPEARANCE UR: CLEAR
AST SERPL W P-5'-P-CCNC: 21 U/L (ref 15–37)
BACTERIA URNS QL MICRO: NEGATIVE /HPF
BASOPHILS # BLD: 0 K/UL (ref 0–0.1)
BASOPHILS NFR BLD: 0 % (ref 0–1)
BILIRUB SERPL-MCNC: 0.9 MG/DL (ref 0.2–1)
BILIRUB UR QL: NEGATIVE
BUN SERPL-MCNC: 21 MG/DL (ref 6–20)
BUN/CREAT SERPL: 26 (ref 12–20)
CA-I BLD-MCNC: 9.3 MG/DL (ref 8.5–10.1)
CHLORIDE SERPL-SCNC: 103 MMOL/L (ref 97–108)
CO2 SERPL-SCNC: 33 MMOL/L (ref 21–32)
COLOR UR: YELLOW
CREAT SERPL-MCNC: 0.81 MG/DL (ref 0.55–1.02)
DIFFERENTIAL METHOD BLD: ABNORMAL
EKG ATRIAL RATE: 68 BPM
EKG DIAGNOSIS: NORMAL
EKG P AXIS: 69 DEGREES
EKG P-R INTERVAL: 216 MS
EKG Q-T INTERVAL: 442 MS
EKG QRS DURATION: 122 MS
EKG QTC CALCULATION (BAZETT): 469 MS
EKG R AXIS: 39 DEGREES
EKG T AXIS: 23 DEGREES
EKG VENTRICULAR RATE: 68 BPM
EOSINOPHIL # BLD: 0.1 K/UL (ref 0–0.4)
EOSINOPHIL NFR BLD: 2 % (ref 0–7)
EPITH CASTS URNS QL MICRO: ABNORMAL /LPF
ERYTHROCYTE [DISTWIDTH] IN BLOOD BY AUTOMATED COUNT: 13.9 % (ref 11.5–14.5)
GLOBULIN SER CALC-MCNC: 3.6 G/DL (ref 2–4)
GLUCOSE SERPL-MCNC: 102 MG/DL (ref 65–100)
GLUCOSE UR STRIP.AUTO-MCNC: NEGATIVE MG/DL
HCT VFR BLD AUTO: 40.1 % (ref 35–47)
HGB BLD-MCNC: 12.6 G/DL (ref 11.5–16)
HGB UR QL STRIP: ABNORMAL
IMM GRANULOCYTES # BLD AUTO: 0 K/UL (ref 0–0.04)
IMM GRANULOCYTES NFR BLD AUTO: 0 % (ref 0–0.5)
KETONES UR QL STRIP.AUTO: NEGATIVE MG/DL
LEUKOCYTE ESTERASE UR QL STRIP.AUTO: ABNORMAL
LYMPHOCYTES # BLD: 1.7 K/UL (ref 0.8–3.5)
LYMPHOCYTES NFR BLD: 31 % (ref 12–49)
MAGNESIUM SERPL-MCNC: 1.9 MG/DL (ref 1.6–2.4)
MCH RBC QN AUTO: 30.9 PG (ref 26–34)
MCHC RBC AUTO-ENTMCNC: 31.4 G/DL (ref 30–36.5)
MCV RBC AUTO: 98.3 FL (ref 80–99)
MONOCYTES # BLD: 0.5 K/UL (ref 0–1)
MONOCYTES NFR BLD: 9 % (ref 5–13)
NEUTS SEG # BLD: 3.1 K/UL (ref 1.8–8)
NEUTS SEG NFR BLD: 58 % (ref 32–75)
NITRITE UR QL STRIP.AUTO: NEGATIVE
PH UR STRIP: 7 (ref 5–8)
PLATELET # BLD AUTO: 87 K/UL (ref 150–400)
POTASSIUM SERPL-SCNC: 4 MMOL/L (ref 3.5–5.1)
PROT SERPL-MCNC: 7.5 G/DL (ref 6.4–8.2)
PROT UR STRIP-MCNC: NEGATIVE MG/DL
RBC # BLD AUTO: 4.08 M/UL (ref 3.8–5.2)
RBC #/AREA URNS HPF: ABNORMAL /HPF (ref 0–5)
SODIUM SERPL-SCNC: 142 MMOL/L (ref 136–145)
SP GR UR REFRACTOMETRY: 1 (ref 1–1.03)
TROPONIN I SERPL HS-MCNC: 22 NG/L (ref 0–51)
URINE CULTURE IF INDICATED: ABNORMAL
UROBILINOGEN UR QL STRIP.AUTO: 0.1 EU/DL (ref 0.2–1)
WBC # BLD AUTO: 5.4 K/UL (ref 3.6–11)
WBC URNS QL MICRO: ABNORMAL /HPF (ref 0–4)

## 2024-05-30 PROCEDURE — 85025 COMPLETE CBC W/AUTO DIFF WBC: CPT

## 2024-05-30 PROCEDURE — 80053 COMPREHEN METABOLIC PANEL: CPT

## 2024-05-30 PROCEDURE — 99285 EMERGENCY DEPT VISIT HI MDM: CPT

## 2024-05-30 PROCEDURE — 83735 ASSAY OF MAGNESIUM: CPT

## 2024-05-30 PROCEDURE — 81001 URINALYSIS AUTO W/SCOPE: CPT

## 2024-05-30 PROCEDURE — 87186 SC STD MICRODIL/AGAR DIL: CPT

## 2024-05-30 PROCEDURE — 87088 URINE BACTERIA CULTURE: CPT

## 2024-05-30 PROCEDURE — 84484 ASSAY OF TROPONIN QUANT: CPT

## 2024-05-30 PROCEDURE — 71045 X-RAY EXAM CHEST 1 VIEW: CPT

## 2024-05-30 PROCEDURE — 87086 URINE CULTURE/COLONY COUNT: CPT

## 2024-05-30 PROCEDURE — 93005 ELECTROCARDIOGRAM TRACING: CPT | Performed by: EMERGENCY MEDICINE

## 2024-05-30 PROCEDURE — 36415 COLL VENOUS BLD VENIPUNCTURE: CPT

## 2024-05-30 ASSESSMENT — PAIN - FUNCTIONAL ASSESSMENT: PAIN_FUNCTIONAL_ASSESSMENT: NONE - DENIES PAIN

## 2024-05-30 NOTE — ED PROVIDER NOTES
[facility] EMERGENCY DEPT  EMERGENCY DEPARTMENT HISTORY AND PHYSICAL EXAM      Date: 5/30/2024  Patient Name: Liliana Sheikh  MRN: [id number]  Birthdate 1940  Date of evaluation: 5/30/2024  Provider: Faina Lam MD   Note Started: 4:40 PM EDT 5/30/24    HISTORY OF PRESENT ILLNESS     Chief Complaint   Patient presents with    Palpitations    Urinary Frequency       History Provided By: Patient    HPI: Liliana Sheikh is a 83 y.o. female with a PMH of HLD, HTN, thrombocytopenia who presents for palpations.  Patient states she has noticed urinary frequency.  For the last few days she is also had some fluttering in her chest.  She has no history of A-fib or any other dysrhythmias.  She has not had any lightheadedness or weakness.  She is otherwise been in her normal state of health.  No chest pain.  No shortness of breath.    PAST MEDICAL HISTORY   Past Medical History:  Past Medical History:   Diagnosis Date    Angina at rest (HCC)     Environmental and seasonal allergies     High cholesterol     History of heart attack     Hypercholesteremia     Hypertension     Pacemaker     Thrombocytopenia (HCC)        Past Surgical History:  Past Surgical History:   Procedure Laterality Date    ANGIOPLASTY      CATARACT REMOVAL      PACEMAKER      TUBAL LIGATION         Family History:  Family History   Problem Relation Age of Onset    Hypertension Maternal Grandmother     Hypertension Mother     Mult Sclerosis Brother     Cancer Maternal Grandmother        Social History:  Social History     Tobacco Use    Smoking status: Former    Smokeless tobacco: Never   Substance Use Topics    Alcohol use: Not Currently    Drug use: Not Currently       Allergies:  Allergies   Allergen Reactions    Latex      Other reaction(s): Unknown (comments)    Amoxicillin-Pot Clavulanate Swelling    Epinephrine Other (See Comments)    Ciprofloxacin Rash    Codeine Nausea Only    Macrobid [Nitrofurantoin] Nausea And Vomiting       PCP: Viridiana Puckett,

## 2024-05-30 NOTE — DISCHARGE INSTRUCTIONS
Thank you for choosing our Emergency Department for your care.  It is our privilege to care for you in your time of need.  In the next several days, you may receive a survey via email or mailed to your home about your experience with our team.  We would greatly appreciate you taking a few minutes to complete the survey, as we use this information to learn what we have done well and what we could be doing better. Thank you for trusting us with your care!    Below you will find a list of your tests from today's visit.   Labs  Recent Results (from the past 12 hour(s))   CBC with Auto Differential    Collection Time: 05/30/24  4:29 PM   Result Value Ref Range    WBC 5.4 3.6 - 11.0 K/uL    RBC 4.08 3.80 - 5.20 M/uL    Hemoglobin 12.6 11.5 - 16.0 g/dL    Hematocrit 40.1 35.0 - 47.0 %    MCV 98.3 80.0 - 99.0 FL    MCH 30.9 26.0 - 34.0 PG    MCHC 31.4 30.0 - 36.5 g/dL    RDW 13.9 11.5 - 14.5 %    Platelets 87 (L) 150 - 400 K/uL    Neutrophils % 58 32 - 75 %    Lymphocytes % 31 12 - 49 %    Monocytes % 9 5 - 13 %    Eosinophils % 2 0 - 7 %    Basophils % 0 0 - 1 %    Immature Granulocytes % 0 0.0 - 0.5 %    Neutrophils Absolute 3.1 1.8 - 8.0 K/UL    Lymphocytes Absolute 1.7 0.8 - 3.5 K/UL    Monocytes Absolute 0.5 0.0 - 1.0 K/UL    Eosinophils Absolute 0.1 0.0 - 0.4 K/UL    Basophils Absolute 0.0 0.0 - 0.1 K/UL    Immature Granulocytes Absolute 0.0 0.00 - 0.04 K/UL    Differential Type AUTOMATED     Comprehensive Metabolic Panel w/ Reflex to MG    Collection Time: 05/30/24  4:29 PM   Result Value Ref Range    Sodium 142 136 - 145 mmol/L    Potassium 4.0 3.5 - 5.1 mmol/L    Chloride 103 97 - 108 mmol/L    CO2 33 (H) 21 - 32 mmol/L    Anion Gap 6 5 - 15 mmol/L    Glucose 102 (H) 65 - 100 mg/dL    BUN 21 (H) 6 - 20 mg/dL    Creatinine 0.81 0.55 - 1.02 mg/dL    BUN/Creatinine Ratio 26 (H) 12 - 20      Est, Glom Filt Rate 72 >60 ml/min/1.73m2    Calcium 9.3 8.5 - 10.1 mg/dL    Total Bilirubin 0.9 0.2 - 1.0 mg/dL    AST 21 15

## 2024-05-30 NOTE — ED TRIAGE NOTES
Feeling like heart is fluttering for about a few days, no hx afib, but does have pacemaker. No c/o chest pain, SOB. Has had a lot of urinary frequency as well

## 2024-06-01 LAB
BACTERIA SPEC CULT: ABNORMAL
COLONY COUNT, CNT: ABNORMAL
COLONY COUNT, CNT: ABNORMAL
Lab: ABNORMAL

## 2024-06-18 ENCOUNTER — OFFICE VISIT (OUTPATIENT)
Facility: CLINIC | Age: 84
End: 2024-06-18

## 2024-06-18 VITALS
OXYGEN SATURATION: 98 % | SYSTOLIC BLOOD PRESSURE: 167 MMHG | HEART RATE: 61 BPM | BODY MASS INDEX: 23.76 KG/M2 | TEMPERATURE: 98.3 F | DIASTOLIC BLOOD PRESSURE: 69 MMHG | WEIGHT: 145 LBS

## 2024-06-18 DIAGNOSIS — F43.9 STRESS: ICD-10-CM

## 2024-06-18 DIAGNOSIS — F09 COGNITIVE DYSFUNCTION: ICD-10-CM

## 2024-06-18 DIAGNOSIS — Z00.00 MEDICARE ANNUAL WELLNESS VISIT, SUBSEQUENT: Primary | ICD-10-CM

## 2024-06-18 DIAGNOSIS — I49.5 SSS (SICK SINUS SYNDROME) (HCC): ICD-10-CM

## 2024-06-18 DIAGNOSIS — I10 ESSENTIAL HYPERTENSION, BENIGN: ICD-10-CM

## 2024-06-18 DIAGNOSIS — I25.118 CORONARY ARTERY DISEASE OF NATIVE ARTERY OF NATIVE HEART WITH STABLE ANGINA PECTORIS (HCC): ICD-10-CM

## 2024-06-18 DIAGNOSIS — E78.00 HYPERCHOLESTEREMIA: ICD-10-CM

## 2024-06-18 DIAGNOSIS — D69.6 THROMBOCYTOPENIA, UNSPECIFIED (HCC): ICD-10-CM

## 2024-06-18 DIAGNOSIS — R73.03 PRE-DIABETES: ICD-10-CM

## 2024-06-18 PROBLEM — R00.2 PALPITATIONS: Status: ACTIVE | Noted: 2024-06-03

## 2024-06-18 PROBLEM — R07.9 CHEST PAIN: Status: ACTIVE | Noted: 2024-06-03

## 2024-06-18 RX ORDER — ESCITALOPRAM OXALATE 10 MG/1
10 TABLET ORAL DAILY
Qty: 90 TABLET | Refills: 1 | Status: SHIPPED | OUTPATIENT
Start: 2024-06-18

## 2024-06-18 RX ORDER — SPIRONOLACTONE 25 MG/1
25 TABLET ORAL DAILY
Qty: 90 TABLET | Refills: 1 | Status: SHIPPED | OUTPATIENT
Start: 2024-06-18

## 2024-06-18 ASSESSMENT — LIFESTYLE VARIABLES
HOW MANY STANDARD DRINKS CONTAINING ALCOHOL DO YOU HAVE ON A TYPICAL DAY: PATIENT DOES NOT DRINK
HOW OFTEN DO YOU HAVE A DRINK CONTAINING ALCOHOL: NEVER

## 2024-06-18 ASSESSMENT — PATIENT HEALTH QUESTIONNAIRE - PHQ9
SUM OF ALL RESPONSES TO PHQ QUESTIONS 1-9: 0
1. LITTLE INTEREST OR PLEASURE IN DOING THINGS: NOT AT ALL
SUM OF ALL RESPONSES TO PHQ QUESTIONS 1-9: 0
SUM OF ALL RESPONSES TO PHQ9 QUESTIONS 1 & 2: 0
2. FEELING DOWN, DEPRESSED OR HOPELESS: NOT AT ALL
SUM OF ALL RESPONSES TO PHQ QUESTIONS 1-9: 0
SUM OF ALL RESPONSES TO PHQ QUESTIONS 1-9: 0

## 2024-06-18 NOTE — PROGRESS NOTES
\"Have you been to the ER, urgent care clinic since your last visit?  Hospitalized since your last visit?\"    Bladder infection    “Have you seen or consulted any other health care providers outside of [facility] since your last visit?”    NO            Click Here for Release of Records Request   
Patient wants to wait.           General HRA Questions:  Select all that apply: (!) Stress    Stress Interventions:  Will place her on Lexapro              Past Medical History:   Diagnosis Date    Angina at rest (HCC)     Environmental and seasonal allergies     High cholesterol     History of heart attack     Hypercholesteremia     Hypertension     Pacemaker     Thrombocytopenia (HCC)               Objective   Vitals:    06/18/24 0916   BP: (!) 167/69   Site: Right Upper Arm   Position: Sitting   Pulse: 61   Temp: 98.3 °F (36.8 °C)   SpO2: 98%   Weight: 65.8 kg (145 lb)      Body mass index is 23.76 kg/m².        Vitals and nursing note reviewed.    Gen:  Not  in no acute distress. Well built and nourished.  HEENT:  [place] conjunctivae, SARAH, EOMI, hearing intact ,tympanic membrane normal.   Mouth: Moist mucous membranes. No TPC  Neck:  Supple, without masses, thyroid not enlarged. +ve carotid bruit  Resp:  No accessory muscle use, clear breath sounds without wheezes rales or rhonchi  Card:  Positive pacemaker left infraclavicular area. No murmurs, normal S1, S2 without thrills, bruits .Trace peripheral edema  Abd:  Soft, non-tender, non-distended, normoactive bowel sounds are present, no palpable organomegaly and no detectable hernias  Breast .  Bilaterally symmetrical.  No mass no discharge no axillary lymph node.  Lymph:  No cervical or inguinal adenopathy  Musc:  No cyanosis or clubbing.   Gait: Normal  Skin:  No rashes or ulcers, skin turgor is good  Neuro: Alert and oriented x 3. Cranial nerves are grossly intact, no focal motor weakness, follows commands appropriately.   Psych:  Good insight, mood normal.        Allergies   Allergen Reactions    Latex      Other reaction(s): Unknown (comments)    Amoxicillin-Pot Clavulanate Swelling    Epinephrine Other (See Comments)    Ciprofloxacin Rash    Codeine Nausea Only    Macrobid [Nitrofurantoin] Nausea And Vomiting     Prior to Visit Medications    Medication Sig

## 2024-06-18 NOTE — PATIENT INSTRUCTIONS
Advised brain challenging activities      Learning About Mild Cognitive Impairment (MCI)  What is mild cognitive impairment (MCI)?     It's common to forget things sometimes as we get older. But some older people have memory loss that's more than normal aging. It's called mild cognitive impairment, or MCI. It is not the same as dementia.  People with the condition often know that their memory or mental function has changed. Tests may show some loss. But their minds work well overall. They can carry out daily tasks that are normal for them.  People with MCI have a higher chance of one day getting dementia. But not all people who have it will get dementia. Some people may stay the same over time.  What are the symptoms?  People with MCI have more memory loss than what occurs with normal aging. They may have increasing trouble with recalling words and keeping up with conversations. They may also have trouble remembering important events and making decisions.  What puts you at risk?  The risk of getting MCI increases with age. Having high blood pressure or having a family history of MCI may also increase your risk.  How is it diagnosed?  Your doctor will do a physical exam.  You may be asked questions to check your memory and other mental skills. Your doctor may also talk to close friends and family members. This can help the doctor figure out how your memory and other mental skills have changed.  You may get blood tests and tests that look at your brain.  These questions and tests can make sure you don't have other conditions that can cause symptoms like MCI. These include depression, sleep problems, and side effects from medicines.  How is it treated?  There are no medicines to treat MCI or to keep it from progressing to dementia. But treating conditions like high blood pressure and diabetes may help. A person with MCI needs routine follow-up visits with their doctor to check on changes in the person's mental

## 2024-06-22 DIAGNOSIS — D69.6 THROMBOCYTOPENIA, UNSPECIFIED (HCC): ICD-10-CM

## 2024-06-22 DIAGNOSIS — R73.09 ABNORMAL BLOOD SUGAR: ICD-10-CM

## 2024-06-22 DIAGNOSIS — I10 ESSENTIAL HYPERTENSION, BENIGN: ICD-10-CM

## 2024-06-22 DIAGNOSIS — E78.00 HYPERCHOLESTEREMIA: ICD-10-CM

## 2024-08-18 DIAGNOSIS — I10 ESSENTIAL HYPERTENSION, BENIGN: ICD-10-CM

## 2024-09-08 ENCOUNTER — HOSPITAL ENCOUNTER (EMERGENCY)
Facility: HOSPITAL | Age: 84
Discharge: HOME | End: 2024-09-08
Attending: EMERGENCY MEDICINE
Payer: MEDICARE

## 2024-09-08 ENCOUNTER — APPOINTMENT (OUTPATIENT)
Facility: HOSPITAL | Age: 84
End: 2024-09-08
Payer: MEDICARE

## 2024-09-08 VITALS
WEIGHT: 141 LBS | HEIGHT: 66 IN | BODY MASS INDEX: 22.66 KG/M2 | DIASTOLIC BLOOD PRESSURE: 77 MMHG | TEMPERATURE: 98.1 F | OXYGEN SATURATION: 99 % | SYSTOLIC BLOOD PRESSURE: 181 MMHG | RESPIRATION RATE: 18 BRPM | HEART RATE: 60 BPM

## 2024-09-08 DIAGNOSIS — I10 ELEVATED BLOOD PRESSURE READING WITH DIAGNOSIS OF HYPERTENSION: ICD-10-CM

## 2024-09-08 DIAGNOSIS — R42 LIGHTHEADEDNESS: ICD-10-CM

## 2024-09-08 DIAGNOSIS — R52 SHOOTING PAIN: ICD-10-CM

## 2024-09-08 DIAGNOSIS — R42 LIGHT HEADEDNESS: Primary | ICD-10-CM

## 2024-09-08 DIAGNOSIS — N39.0 URINARY TRACT INFECTION WITHOUT HEMATURIA, SITE UNSPECIFIED: ICD-10-CM

## 2024-09-08 LAB
ALBUMIN SERPL-MCNC: 3.8 G/DL (ref 3.5–5)
ALBUMIN/GLOB SERPL: 1 (ref 1.1–2.2)
ALP SERPL-CCNC: 58 U/L (ref 45–117)
ALT SERPL-CCNC: 32 U/L (ref 12–78)
ANION GAP SERPL CALC-SCNC: 3 MMOL/L (ref 2–12)
APPEARANCE UR: ABNORMAL
AST SERPL W P-5'-P-CCNC: 26 U/L (ref 15–37)
BACTERIA URNS QL MICRO: ABNORMAL /HPF
BASOPHILS # BLD: 0 K/UL (ref 0–0.1)
BASOPHILS NFR BLD: 1 % (ref 0–1)
BILIRUB SERPL-MCNC: 1 MG/DL (ref 0.2–1)
BILIRUB UR QL: NEGATIVE
BUN SERPL-MCNC: 20 MG/DL (ref 6–20)
BUN/CREAT SERPL: 23 (ref 12–20)
CA-I BLD-MCNC: 9.7 MG/DL (ref 8.5–10.1)
CHLORIDE SERPL-SCNC: 103 MMOL/L (ref 97–108)
CO2 SERPL-SCNC: 37 MMOL/L (ref 21–32)
COLOR UR: YELLOW
CREAT SERPL-MCNC: 0.88 MG/DL (ref 0.55–1.02)
DIFFERENTIAL METHOD BLD: ABNORMAL
EOSINOPHIL # BLD: 0.1 K/UL (ref 0–0.4)
EOSINOPHIL NFR BLD: 2 % (ref 0–7)
EPITH CASTS URNS QL MICRO: ABNORMAL /LPF
ERYTHROCYTE [DISTWIDTH] IN BLOOD BY AUTOMATED COUNT: 14.3 % (ref 11.5–14.5)
GLOBULIN SER CALC-MCNC: 3.8 G/DL (ref 2–4)
GLUCOSE SERPL-MCNC: 89 MG/DL (ref 65–100)
GLUCOSE UR STRIP.AUTO-MCNC: NEGATIVE MG/DL
HCT VFR BLD AUTO: 41.7 % (ref 35–47)
HGB BLD-MCNC: 13 G/DL (ref 11.5–16)
HGB UR QL STRIP: ABNORMAL
IMM GRANULOCYTES # BLD AUTO: 0 K/UL (ref 0–0.04)
IMM GRANULOCYTES NFR BLD AUTO: 0 % (ref 0–0.5)
KETONES UR QL STRIP.AUTO: NEGATIVE MG/DL
LEUKOCYTE ESTERASE UR QL STRIP.AUTO: ABNORMAL
LYMPHOCYTES # BLD: 1.5 K/UL (ref 0.8–3.5)
LYMPHOCYTES NFR BLD: 26 % (ref 12–49)
MCH RBC QN AUTO: 30.7 PG (ref 26–34)
MCHC RBC AUTO-ENTMCNC: 31.2 G/DL (ref 30–36.5)
MCV RBC AUTO: 98.3 FL (ref 80–99)
MONOCYTES # BLD: 0.5 K/UL (ref 0–1)
MONOCYTES NFR BLD: 9 % (ref 5–13)
NEUTS SEG # BLD: 3.6 K/UL (ref 1.8–8)
NEUTS SEG NFR BLD: 62 % (ref 32–75)
NITRITE UR QL STRIP.AUTO: NEGATIVE
PH UR STRIP: 7 (ref 5–8)
PLATELET # BLD AUTO: 88 K/UL (ref 150–400)
PMV BLD AUTO: 14 FL (ref 8.9–12.9)
POTASSIUM SERPL-SCNC: 4.3 MMOL/L (ref 3.5–5.1)
PROT SERPL-MCNC: 7.6 G/DL (ref 6.4–8.2)
PROT UR STRIP-MCNC: NEGATIVE MG/DL
RBC # BLD AUTO: 4.24 M/UL (ref 3.8–5.2)
RBC #/AREA URNS HPF: ABNORMAL /HPF (ref 0–5)
SODIUM SERPL-SCNC: 143 MMOL/L (ref 136–145)
SP GR UR REFRACTOMETRY: 1 (ref 1–1.03)
TROPONIN I SERPL HS-MCNC: 27 NG/L (ref 0–51)
URINE CULTURE IF INDICATED: ABNORMAL
UROBILINOGEN UR QL STRIP.AUTO: 0.1 EU/DL (ref 0.2–1)
WBC # BLD AUTO: 5.8 K/UL (ref 3.6–11)
WBC URNS QL MICRO: ABNORMAL /HPF (ref 0–4)

## 2024-09-08 PROCEDURE — 87186 SC STD MICRODIL/AGAR DIL: CPT

## 2024-09-08 PROCEDURE — 80053 COMPREHEN METABOLIC PANEL: CPT

## 2024-09-08 PROCEDURE — 84484 ASSAY OF TROPONIN QUANT: CPT

## 2024-09-08 PROCEDURE — 70450 CT HEAD/BRAIN W/O DYE: CPT

## 2024-09-08 PROCEDURE — 87086 URINE CULTURE/COLONY COUNT: CPT

## 2024-09-08 PROCEDURE — 85025 COMPLETE CBC W/AUTO DIFF WBC: CPT

## 2024-09-08 PROCEDURE — 87088 URINE BACTERIA CULTURE: CPT

## 2024-09-08 PROCEDURE — 93005 ELECTROCARDIOGRAM TRACING: CPT | Performed by: EMERGENCY MEDICINE

## 2024-09-08 PROCEDURE — 99284 EMERGENCY DEPT VISIT MOD MDM: CPT

## 2024-09-08 PROCEDURE — 36415 COLL VENOUS BLD VENIPUNCTURE: CPT

## 2024-09-08 PROCEDURE — 81001 URINALYSIS AUTO W/SCOPE: CPT

## 2024-09-08 RX ORDER — POTASSIUM CHLORIDE 750 MG/1
10 TABLET, EXTENDED RELEASE ORAL DAILY
COMMUNITY
Start: 2024-06-30

## 2024-09-08 ASSESSMENT — PAIN - FUNCTIONAL ASSESSMENT: PAIN_FUNCTIONAL_ASSESSMENT: NONE - DENIES PAIN

## 2024-09-09 LAB
EKG ATRIAL RATE: 60 BPM
EKG DIAGNOSIS: NORMAL
EKG P AXIS: 78 DEGREES
EKG P-R INTERVAL: 186 MS
EKG Q-T INTERVAL: 446 MS
EKG QRS DURATION: 120 MS
EKG QTC CALCULATION (BAZETT): 446 MS
EKG R AXIS: 35 DEGREES
EKG T AXIS: 21 DEGREES
EKG VENTRICULAR RATE: 60 BPM

## 2024-09-09 RX ORDER — SULFAMETHOXAZOLE/TRIMETHOPRIM 800-160 MG
1 TABLET ORAL 2 TIMES DAILY
Qty: 10 TABLET | Refills: 0 | Status: SHIPPED | OUTPATIENT
Start: 2024-09-09 | End: 2024-09-14

## 2024-09-11 LAB
BACTERIA SPEC CULT: ABNORMAL
COLONY COUNT, CNT: ABNORMAL
COLONY COUNT, CNT: ABNORMAL
Lab: ABNORMAL

## 2024-09-17 ENCOUNTER — OFFICE VISIT (OUTPATIENT)
Facility: CLINIC | Age: 84
End: 2024-09-17
Payer: MEDICARE

## 2024-09-17 VITALS
HEART RATE: 62 BPM | BODY MASS INDEX: 23.3 KG/M2 | RESPIRATION RATE: 15 BRPM | SYSTOLIC BLOOD PRESSURE: 150 MMHG | HEIGHT: 66 IN | WEIGHT: 145 LBS | OXYGEN SATURATION: 99 % | TEMPERATURE: 98.4 F | DIASTOLIC BLOOD PRESSURE: 80 MMHG

## 2024-09-17 DIAGNOSIS — D69.6 THROMBOCYTOPENIA, UNSPECIFIED (HCC): ICD-10-CM

## 2024-09-17 DIAGNOSIS — A49.9 UTI (URINARY TRACT INFECTION), BACTERIAL: ICD-10-CM

## 2024-09-17 DIAGNOSIS — I10 ESSENTIAL HYPERTENSION, BENIGN: Primary | ICD-10-CM

## 2024-09-17 DIAGNOSIS — R73.03 PRE-DIABETES: ICD-10-CM

## 2024-09-17 DIAGNOSIS — I49.5 SSS (SICK SINUS SYNDROME) (HCC): ICD-10-CM

## 2024-09-17 DIAGNOSIS — N39.0 UTI (URINARY TRACT INFECTION), BACTERIAL: ICD-10-CM

## 2024-09-17 DIAGNOSIS — E78.00 PURE HYPERCHOLESTEROLEMIA, UNSPECIFIED: ICD-10-CM

## 2024-09-17 DIAGNOSIS — I25.118 CORONARY ARTERY DISEASE OF NATIVE ARTERY OF NATIVE HEART WITH STABLE ANGINA PECTORIS (HCC): ICD-10-CM

## 2024-09-17 PROCEDURE — G8427 DOCREV CUR MEDS BY ELIG CLIN: HCPCS | Performed by: INTERNAL MEDICINE

## 2024-09-17 PROCEDURE — 3078F DIAST BP <80 MM HG: CPT | Performed by: INTERNAL MEDICINE

## 2024-09-17 PROCEDURE — 3077F SYST BP >= 140 MM HG: CPT | Performed by: INTERNAL MEDICINE

## 2024-09-17 PROCEDURE — G8399 PT W/DXA RESULTS DOCUMENT: HCPCS | Performed by: INTERNAL MEDICINE

## 2024-09-17 PROCEDURE — 1036F TOBACCO NON-USER: CPT | Performed by: INTERNAL MEDICINE

## 2024-09-17 PROCEDURE — 1123F ACP DISCUSS/DSCN MKR DOCD: CPT | Performed by: INTERNAL MEDICINE

## 2024-09-17 PROCEDURE — 99214 OFFICE O/P EST MOD 30 MIN: CPT | Performed by: INTERNAL MEDICINE

## 2024-09-17 PROCEDURE — G8420 CALC BMI NORM PARAMETERS: HCPCS | Performed by: INTERNAL MEDICINE

## 2024-09-17 PROCEDURE — 1090F PRES/ABSN URINE INCON ASSESS: CPT | Performed by: INTERNAL MEDICINE

## 2024-09-17 ASSESSMENT — ENCOUNTER SYMPTOMS
DIARRHEA: 0
NAUSEA: 0
ABDOMINAL PAIN: 0
VOMITING: 0
COUGH: 0
SHORTNESS OF BREATH: 0

## 2024-09-30 DIAGNOSIS — J30.89 ENVIRONMENTAL AND SEASONAL ALLERGIES: ICD-10-CM

## 2024-09-30 RX ORDER — CETIRIZINE HYDROCHLORIDE 10 MG/1
10 TABLET ORAL DAILY
Qty: 90 TABLET | Refills: 1 | Status: SHIPPED | OUTPATIENT
Start: 2024-09-30 | End: 2024-10-02 | Stop reason: SDUPTHER

## 2024-09-30 NOTE — TELEPHONE ENCOUNTER
Patient called in stating that she needed a refill on   cetirizine (ZYRTEC) 10 MG tablet and sent too   [facility] - [place], [place] - [address] - P [phone] - F [phone]  [address] [facility] [address], [place] [address]

## 2024-10-02 DIAGNOSIS — J30.89 ENVIRONMENTAL AND SEASONAL ALLERGIES: ICD-10-CM

## 2024-10-02 RX ORDER — CETIRIZINE HYDROCHLORIDE 10 MG/1
10 TABLET ORAL DAILY
Qty: 90 TABLET | Refills: 1 | Status: SHIPPED | OUTPATIENT
Start: 2024-10-02

## 2024-10-24 ENCOUNTER — HOSPITAL ENCOUNTER (OUTPATIENT)
Facility: HOSPITAL | Age: 84
Discharge: HOME | End: 2024-10-27
Attending: INTERNAL MEDICINE
Payer: MEDICARE

## 2024-10-24 DIAGNOSIS — D69.6 THROMBOCYTOPENIA, UNSPECIFIED (HCC): ICD-10-CM

## 2024-10-24 PROCEDURE — 76705 ECHO EXAM OF ABDOMEN: CPT

## 2025-01-17 DIAGNOSIS — I10 ESSENTIAL HYPERTENSION, BENIGN: ICD-10-CM

## 2025-01-17 DIAGNOSIS — R73.03 PRE-DIABETES: ICD-10-CM

## 2025-01-17 DIAGNOSIS — E78.00 PURE HYPERCHOLESTEROLEMIA, UNSPECIFIED: ICD-10-CM

## 2025-01-17 DIAGNOSIS — D69.6 THROMBOCYTOPENIA, UNSPECIFIED (HCC): ICD-10-CM

## 2025-01-20 ENCOUNTER — OFFICE VISIT (OUTPATIENT)
Facility: CLINIC | Age: 85
End: 2025-01-20
Payer: MEDICARE

## 2025-01-20 VITALS
HEART RATE: 60 BPM | BODY MASS INDEX: 23.46 KG/M2 | TEMPERATURE: 98 F | OXYGEN SATURATION: 97 % | HEIGHT: 66 IN | SYSTOLIC BLOOD PRESSURE: 158 MMHG | RESPIRATION RATE: 18 BRPM | DIASTOLIC BLOOD PRESSURE: 74 MMHG | WEIGHT: 146 LBS

## 2025-01-20 DIAGNOSIS — E78.00 PURE HYPERCHOLESTEROLEMIA, UNSPECIFIED: ICD-10-CM

## 2025-01-20 DIAGNOSIS — I10 ESSENTIAL HYPERTENSION, BENIGN: ICD-10-CM

## 2025-01-20 DIAGNOSIS — D69.6 THROMBOCYTOPENIA, UNSPECIFIED (HCC): ICD-10-CM

## 2025-01-20 DIAGNOSIS — R73.03 PRE-DIABETES: ICD-10-CM

## 2025-01-20 DIAGNOSIS — I49.5 SSS (SICK SINUS SYNDROME) (HCC): ICD-10-CM

## 2025-01-20 DIAGNOSIS — I25.118 CORONARY ARTERY DISEASE OF NATIVE ARTERY OF NATIVE HEART WITH STABLE ANGINA PECTORIS (HCC): ICD-10-CM

## 2025-01-20 DIAGNOSIS — I10 ESSENTIAL HYPERTENSION, BENIGN: Primary | ICD-10-CM

## 2025-01-20 DIAGNOSIS — N32.81 OVERACTIVE BLADDER: ICD-10-CM

## 2025-01-20 PROCEDURE — G8399 PT W/DXA RESULTS DOCUMENT: HCPCS | Performed by: INTERNAL MEDICINE

## 2025-01-20 PROCEDURE — 3077F SYST BP >= 140 MM HG: CPT | Performed by: INTERNAL MEDICINE

## 2025-01-20 PROCEDURE — 1126F AMNT PAIN NOTED NONE PRSNT: CPT | Performed by: INTERNAL MEDICINE

## 2025-01-20 PROCEDURE — 3078F DIAST BP <80 MM HG: CPT | Performed by: INTERNAL MEDICINE

## 2025-01-20 PROCEDURE — 1090F PRES/ABSN URINE INCON ASSESS: CPT | Performed by: INTERNAL MEDICINE

## 2025-01-20 PROCEDURE — 1036F TOBACCO NON-USER: CPT | Performed by: INTERNAL MEDICINE

## 2025-01-20 PROCEDURE — G8427 DOCREV CUR MEDS BY ELIG CLIN: HCPCS | Performed by: INTERNAL MEDICINE

## 2025-01-20 PROCEDURE — 1159F MED LIST DOCD IN RCRD: CPT | Performed by: INTERNAL MEDICINE

## 2025-01-20 PROCEDURE — 1160F RVW MEDS BY RX/DR IN RCRD: CPT | Performed by: INTERNAL MEDICINE

## 2025-01-20 PROCEDURE — 99214 OFFICE O/P EST MOD 30 MIN: CPT | Performed by: INTERNAL MEDICINE

## 2025-01-20 PROCEDURE — 1123F ACP DISCUSS/DSCN MKR DOCD: CPT | Performed by: INTERNAL MEDICINE

## 2025-01-20 PROCEDURE — G8420 CALC BMI NORM PARAMETERS: HCPCS | Performed by: INTERNAL MEDICINE

## 2025-01-20 RX ORDER — AMLODIPINE BESYLATE 5 MG/1
5 TABLET ORAL 2 TIMES DAILY
Qty: 180 TABLET | Refills: 1 | Status: SHIPPED | OUTPATIENT
Start: 2025-01-20

## 2025-01-20 SDOH — ECONOMIC STABILITY: FOOD INSECURITY: WITHIN THE PAST 12 MONTHS, YOU WORRIED THAT YOUR FOOD WOULD RUN OUT BEFORE YOU GOT MONEY TO BUY MORE.: NEVER TRUE

## 2025-01-20 SDOH — ECONOMIC STABILITY: FOOD INSECURITY: WITHIN THE PAST 12 MONTHS, THE FOOD YOU BOUGHT JUST DIDN'T LAST AND YOU DIDN'T HAVE MONEY TO GET MORE.: NEVER TRUE

## 2025-01-20 ASSESSMENT — ENCOUNTER SYMPTOMS
ABDOMINAL PAIN: 0
VOMITING: 0
NAUSEA: 0
DIARRHEA: 0
COUGH: 0
SHORTNESS OF BREATH: 0

## 2025-01-20 ASSESSMENT — PATIENT HEALTH QUESTIONNAIRE - PHQ9
SUM OF ALL RESPONSES TO PHQ QUESTIONS 1-9: 0
2. FEELING DOWN, DEPRESSED OR HOPELESS: NOT AT ALL
SUM OF ALL RESPONSES TO PHQ QUESTIONS 1-9: 0
1. LITTLE INTEREST OR PLEASURE IN DOING THINGS: NOT AT ALL
SUM OF ALL RESPONSES TO PHQ QUESTIONS 1-9: 0
SUM OF ALL RESPONSES TO PHQ9 QUESTIONS 1 & 2: 0
SUM OF ALL RESPONSES TO PHQ QUESTIONS 1-9: 0

## 2025-01-22 RX ORDER — POTASSIUM CHLORIDE 750 MG/1
10 TABLET, EXTENDED RELEASE ORAL DAILY
Qty: 180 TABLET | Refills: 1 | Status: SHIPPED | OUTPATIENT
Start: 2025-01-22

## 2025-01-22 NOTE — TELEPHONE ENCOUNTER
Patient called the office stating that she needs a refill on her medication   potassium chloride and stated she would like a written prescription

## 2025-02-10 ENCOUNTER — APPOINTMENT (OUTPATIENT)
Facility: HOSPITAL | Age: 85
End: 2025-02-10
Payer: MEDICARE

## 2025-02-10 ENCOUNTER — HOSPITAL ENCOUNTER (EMERGENCY)
Facility: HOSPITAL | Age: 85
Discharge: HOME | End: 2025-02-10
Payer: MEDICARE

## 2025-02-10 VITALS
WEIGHT: 140 LBS | TEMPERATURE: 98.3 F | SYSTOLIC BLOOD PRESSURE: 155 MMHG | OXYGEN SATURATION: 100 % | BODY MASS INDEX: 22.5 KG/M2 | RESPIRATION RATE: 16 BRPM | DIASTOLIC BLOOD PRESSURE: 70 MMHG | HEART RATE: 62 BPM | HEIGHT: 66 IN

## 2025-02-10 DIAGNOSIS — R00.2 PALPITATIONS: ICD-10-CM

## 2025-02-10 DIAGNOSIS — N30.01 ACUTE CYSTITIS WITH HEMATURIA: Primary | ICD-10-CM

## 2025-02-10 LAB
ALBUMIN SERPL-MCNC: 4.1 G/DL (ref 3.5–5)
ALBUMIN/GLOB SERPL: 1.1 (ref 1.1–2.2)
ALP SERPL-CCNC: 68 U/L (ref 45–117)
ALT SERPL-CCNC: 29 U/L (ref 12–78)
ANION GAP SERPL CALC-SCNC: 5 MMOL/L (ref 2–12)
APPEARANCE UR: ABNORMAL
AST SERPL W P-5'-P-CCNC: 28 U/L (ref 15–37)
BACTERIA URNS QL MICRO: ABNORMAL /HPF
BASOPHILS # BLD: 0.04 K/UL (ref 0–0.1)
BASOPHILS NFR BLD: 0.6 % (ref 0–1)
BILIRUB SERPL-MCNC: 1.1 MG/DL (ref 0.2–1)
BILIRUB UR QL: NEGATIVE
BUN SERPL-MCNC: 19 MG/DL (ref 6–20)
BUN/CREAT SERPL: 26 (ref 12–20)
CA-I BLD-MCNC: 9.5 MG/DL (ref 8.5–10.1)
CHLORIDE SERPL-SCNC: 102 MMOL/L (ref 97–108)
CO2 SERPL-SCNC: 33 MMOL/L (ref 21–32)
COLOR UR: YELLOW
CREAT SERPL-MCNC: 0.74 MG/DL (ref 0.55–1.02)
DIFFERENTIAL METHOD BLD: ABNORMAL
EOSINOPHIL # BLD: 0.1 K/UL (ref 0–0.4)
EOSINOPHIL NFR BLD: 1.6 % (ref 0–7)
EPITH CASTS URNS QL MICRO: ABNORMAL /LPF
ERYTHROCYTE [DISTWIDTH] IN BLOOD BY AUTOMATED COUNT: 13.9 % (ref 11.5–14.5)
GLOBULIN SER CALC-MCNC: 3.6 G/DL (ref 2–4)
GLUCOSE SERPL-MCNC: 116 MG/DL (ref 65–100)
GLUCOSE UR STRIP.AUTO-MCNC: NEGATIVE MG/DL
HCT VFR BLD AUTO: 42 % (ref 35–47)
HGB BLD-MCNC: 13.4 G/DL (ref 11.5–16)
HGB UR QL STRIP: ABNORMAL
IMM GRANULOCYTES # BLD AUTO: 0.01 K/UL (ref 0–0.04)
IMM GRANULOCYTES NFR BLD AUTO: 0.2 % (ref 0–0.5)
KETONES UR QL STRIP.AUTO: NEGATIVE MG/DL
LEUKOCYTE ESTERASE UR QL STRIP.AUTO: ABNORMAL
LYMPHOCYTES # BLD: 1.52 K/UL (ref 0.8–3.5)
LYMPHOCYTES NFR BLD: 24.5 % (ref 12–49)
MAGNESIUM SERPL-MCNC: 2.2 MG/DL (ref 1.6–2.4)
MCH RBC QN AUTO: 30.5 PG (ref 26–34)
MCHC RBC AUTO-ENTMCNC: 31.9 G/DL (ref 30–36.5)
MCV RBC AUTO: 95.7 FL (ref 80–99)
MONOCYTES # BLD: 0.42 K/UL (ref 0–1)
MONOCYTES NFR BLD: 6.7 % (ref 5–13)
NEUTS SEG # BLD: 4.12 K/UL (ref 1.8–8)
NEUTS SEG NFR BLD: 66.4 % (ref 32–75)
NITRITE UR QL STRIP.AUTO: POSITIVE
PH UR STRIP: 8 (ref 5–8)
PLATELET # BLD AUTO: 103 K/UL (ref 150–400)
POTASSIUM SERPL-SCNC: 4.4 MMOL/L (ref 3.5–5.1)
PROT SERPL-MCNC: 7.7 G/DL (ref 6.4–8.2)
PROT UR STRIP-MCNC: NEGATIVE MG/DL
RBC # BLD AUTO: 4.39 M/UL (ref 3.8–5.2)
RBC #/AREA URNS HPF: ABNORMAL /HPF (ref 0–5)
RBC MORPH BLD: ABNORMAL
SODIUM SERPL-SCNC: 140 MMOL/L (ref 136–145)
SP GR UR REFRACTOMETRY: 1.01 (ref 1–1.03)
TROPONIN I SERPL HS-MCNC: 22 NG/L (ref 0–51)
TROPONIN I SERPL HS-MCNC: 24 NG/L (ref 0–51)
URINE CULTURE IF INDICATED: ABNORMAL
UROBILINOGEN UR QL STRIP.AUTO: 0.1 EU/DL (ref 0.2–1)
WBC # BLD AUTO: 6.2 K/UL (ref 3.6–11)
WBC URNS QL MICRO: ABNORMAL /HPF (ref 0–4)

## 2025-02-10 PROCEDURE — 85025 COMPLETE CBC W/AUTO DIFF WBC: CPT

## 2025-02-10 PROCEDURE — 80053 COMPREHEN METABOLIC PANEL: CPT

## 2025-02-10 PROCEDURE — 87086 URINE CULTURE/COLONY COUNT: CPT

## 2025-02-10 PROCEDURE — 99285 EMERGENCY DEPT VISIT HI MDM: CPT

## 2025-02-10 PROCEDURE — 87186 SC STD MICRODIL/AGAR DIL: CPT

## 2025-02-10 PROCEDURE — 6370000000 HC RX 637 (ALT 250 FOR IP): Performed by: NURSE PRACTITIONER

## 2025-02-10 PROCEDURE — 71045 X-RAY EXAM CHEST 1 VIEW: CPT

## 2025-02-10 PROCEDURE — 87088 URINE BACTERIA CULTURE: CPT

## 2025-02-10 PROCEDURE — 81001 URINALYSIS AUTO W/SCOPE: CPT

## 2025-02-10 PROCEDURE — 84484 ASSAY OF TROPONIN QUANT: CPT

## 2025-02-10 PROCEDURE — 93005 ELECTROCARDIOGRAM TRACING: CPT | Performed by: EMERGENCY MEDICINE

## 2025-02-10 PROCEDURE — 83735 ASSAY OF MAGNESIUM: CPT

## 2025-02-10 RX ORDER — CEFDINIR 300 MG/1
300 CAPSULE ORAL
Status: COMPLETED | OUTPATIENT
Start: 2025-02-10 | End: 2025-02-10

## 2025-02-10 RX ORDER — CEFPODOXIME PROXETIL 200 MG/1
200 TABLET, FILM COATED ORAL 2 TIMES DAILY
Qty: 20 TABLET | Refills: 0 | Status: SHIPPED | OUTPATIENT
Start: 2025-02-10 | End: 2025-02-10

## 2025-02-10 RX ORDER — CEFPODOXIME PROXETIL 200 MG/1
200 TABLET, FILM COATED ORAL 2 TIMES DAILY
Qty: 20 TABLET | Refills: 0 | Status: SHIPPED | OUTPATIENT
Start: 2025-02-10 | End: 2025-02-20

## 2025-02-10 RX ADMIN — CEFDINIR 300 MG: 300 CAPSULE ORAL at 21:01

## 2025-02-10 ASSESSMENT — PAIN - FUNCTIONAL ASSESSMENT
PAIN_FUNCTIONAL_ASSESSMENT: NONE - DENIES PAIN
PAIN_FUNCTIONAL_ASSESSMENT: NONE - DENIES PAIN

## 2025-02-10 NOTE — ED TRIAGE NOTES
Possible bladder infection, urinary retention, aching when urinating. Has been taking cranberry supplements. Also states that she feels like she has a flutter in her heart. Not happening now but did just before she came in. Also back of head and neck pain, has tried using sinus rinse

## 2025-02-11 LAB
EKG ATRIAL RATE: 60 BPM
EKG DIAGNOSIS: NORMAL
EKG P AXIS: 70 DEGREES
EKG P-R INTERVAL: 182 MS
EKG Q-T INTERVAL: 458 MS
EKG QRS DURATION: 124 MS
EKG QTC CALCULATION (BAZETT): 458 MS
EKG R AXIS: 41 DEGREES
EKG T AXIS: 30 DEGREES
EKG VENTRICULAR RATE: 60 BPM

## 2025-02-11 NOTE — DISCHARGE INSTRUCTIONS
Thank you for choosing our Emergency Department for your care.  It is our privilege to care for you in your time of need.  In the next several days, you may receive a survey via email or mailed to your home about your experience with our team.  We would greatly appreciate you taking a few minutes to complete the survey, as we use this information to learn what we have done well and what we could be doing better. Thank you for trusting us with your care!    Below you will find a list of your tests from today's visit.   Labs and Radiology Studies  Recent Results (from the past 12 hour(s))   Urinalysis with Reflex to Culture    Collection Time: 02/10/25  5:25 PM    Specimen: Urine   Result Value Ref Range    Color, UA Yellow      Appearance Hazy (A) Clear      Specific Gravity, UA 1.015 1.003 - 1.030      pH, Urine 8.0 5.0 - 8.0      Protein, UA Negative Negative mg/dL    Glucose, Ur Negative Negative mg/dL    Ketones, Urine Negative Negative mg/dL    Bilirubin, Urine Negative Negative      Blood, Urine Small (A) Negative      Urobilinogen, Urine 0.1 (L) 0.2 - 1.0 EU/dL    Nitrite, Urine Positive (A) Negative      Leukocyte Esterase, Urine Large (A) Negative      WBC, UA 10-20 0 - 4 /hpf    RBC, UA 0-5 0 - 5 /hpf    Epithelial Cells, UA Few Few /lpf    BACTERIA, URINE 2+ (A) Negative /hpf    Urine Culture if Indicated Urine Culture Ordered (A) Culture not indicated by UA result     CBC with Auto Differential    Collection Time: 02/10/25  6:30 PM   Result Value Ref Range    WBC 6.2 3.6 - 11.0 K/uL    RBC 4.39 3.80 - 5.20 M/uL    Hemoglobin 13.4 11.5 - 16.0 g/dL    Hematocrit 42.0 35.0 - 47.0 %    MCV 95.7 80.0 - 99.0 FL    MCH 30.5 26.0 - 34.0 PG    MCHC 31.9 30.0 - 36.5 g/dL    RDW 13.9 11.5 - 14.5 %    Platelets 103 (L) 150 - 400 K/uL    Neutrophils % 66.4 32.0 - 75.0 %    Lymphocytes % 24.5 12.0 - 49.0 %    Monocytes % 6.7 5.0 - 13.0 %    Eosinophils % 1.6 0.0 - 7.0 %    Basophils % 0.6 0.0 - 1.0 %    Immature

## 2025-02-11 NOTE — ED PROVIDER NOTES
Allergies:  Allergies   Allergen Reactions    Latex      Other reaction(s): Unknown (comments)    Amoxicillin-Pot Clavulanate Swelling    Epinephrine Other (See Comments)    Ciprofloxacin Rash    Codeine Nausea Only    Macrobid [Nitrofurantoin] Nausea And Vomiting       PCP: Viridiana Puckett MD    Current Meds:   No current facility-administered medications for this encounter.     Current Outpatient Medications   Medication Sig Dispense Refill    cefpodoxime (VANTIN) 200 MG tablet Take 1 tablet by mouth 2 times daily for 10 days 20 tablet 0    potassium chloride (KLOR-CON M) 10 MEQ extended release tablet Take 1 tablet by mouth daily 180 tablet 1    amLODIPine (NORVASC) 5 MG tablet Take 1 tablet by mouth in the morning and at bedtime 180 tablet 1    cetirizine (ZYRTEC) 10 MG tablet Take 1 tablet by mouth daily 90 tablet 1    fluticasone (FLONASE) 50 MCG/ACT nasal spray 2 sprays by Each Nostril route daily 16 g 1    aspirin 81 MG EC tablet Take 1 tablet by mouth daily      atorvastatin (LIPITOR) 20 MG tablet Take 1 tablet by mouth daily      carvedilol (COREG) 25 MG tablet Take 1 tablet by mouth 2 times daily (with meals)      vitamin D 25 MCG (1000 UT) CAPS Take 1 capsule by mouth daily      coenzyme Q10 100 MG CAPS capsule Take 1 capsule by mouth daily      isosorbide mononitrate (ISMO;MONOKET) 20 MG tablet Take 3 tablets by mouth daily      nitroGLYCERIN (NITROSTAT) 0.3 MG SL tablet Place 0.4 mg under the tongue         Social Determinants of Health:   Social Determinants of Health     Tobacco Use: Medium Risk (2/10/2025)    Patient History     Smoking Tobacco Use: Former     Smokeless Tobacco Use: Never     Passive Exposure: Not on file   Alcohol Use: Not At Risk (9/8/2024)    AUDIT-C     Frequency of Alcohol Consumption: Never     Average Number of Drinks: Patient does not drink     Frequency of Binge Drinking: Never   Financial Resource Strain: Low Risk  (9/20/2023)    Overall Financial Resource

## 2025-02-12 LAB
BACTERIA SPEC CULT: ABNORMAL
COLONY COUNT, CNT: ABNORMAL
Lab: ABNORMAL

## 2025-03-03 ENCOUNTER — OFFICE VISIT (OUTPATIENT)
Facility: CLINIC | Age: 85
End: 2025-03-03
Payer: MEDICARE

## 2025-03-03 VITALS
RESPIRATION RATE: 16 BRPM | HEIGHT: 66 IN | TEMPERATURE: 97.9 F | DIASTOLIC BLOOD PRESSURE: 60 MMHG | BODY MASS INDEX: 23.14 KG/M2 | HEART RATE: 67 BPM | SYSTOLIC BLOOD PRESSURE: 136 MMHG | WEIGHT: 144 LBS

## 2025-03-03 DIAGNOSIS — D69.6 THROMBOCYTOPENIA, UNSPECIFIED: ICD-10-CM

## 2025-03-03 DIAGNOSIS — R09.89 CHRONIC SINUS COMPLAINTS: ICD-10-CM

## 2025-03-03 DIAGNOSIS — R73.03 PRE-DIABETES: ICD-10-CM

## 2025-03-03 DIAGNOSIS — I49.5 SSS (SICK SINUS SYNDROME) (HCC): ICD-10-CM

## 2025-03-03 DIAGNOSIS — E78.00 HYPERCHOLESTEREMIA: ICD-10-CM

## 2025-03-03 DIAGNOSIS — I65.23 CAROTID STENOSIS, ASYMPTOMATIC, BILATERAL: ICD-10-CM

## 2025-03-03 DIAGNOSIS — I25.118 CORONARY ARTERY DISEASE OF NATIVE ARTERY OF NATIVE HEART WITH STABLE ANGINA PECTORIS: ICD-10-CM

## 2025-03-03 DIAGNOSIS — J30.89 ENVIRONMENTAL AND SEASONAL ALLERGIES: ICD-10-CM

## 2025-03-03 DIAGNOSIS — I10 ESSENTIAL HYPERTENSION, BENIGN: Primary | ICD-10-CM

## 2025-03-03 PROCEDURE — 3075F SYST BP GE 130 - 139MM HG: CPT | Performed by: INTERNAL MEDICINE

## 2025-03-03 PROCEDURE — 1159F MED LIST DOCD IN RCRD: CPT | Performed by: INTERNAL MEDICINE

## 2025-03-03 PROCEDURE — G8427 DOCREV CUR MEDS BY ELIG CLIN: HCPCS | Performed by: INTERNAL MEDICINE

## 2025-03-03 PROCEDURE — 1160F RVW MEDS BY RX/DR IN RCRD: CPT | Performed by: INTERNAL MEDICINE

## 2025-03-03 PROCEDURE — 3078F DIAST BP <80 MM HG: CPT | Performed by: INTERNAL MEDICINE

## 2025-03-03 PROCEDURE — 1123F ACP DISCUSS/DSCN MKR DOCD: CPT | Performed by: INTERNAL MEDICINE

## 2025-03-03 PROCEDURE — 1090F PRES/ABSN URINE INCON ASSESS: CPT | Performed by: INTERNAL MEDICINE

## 2025-03-03 PROCEDURE — G8399 PT W/DXA RESULTS DOCUMENT: HCPCS | Performed by: INTERNAL MEDICINE

## 2025-03-03 PROCEDURE — 1036F TOBACCO NON-USER: CPT | Performed by: INTERNAL MEDICINE

## 2025-03-03 PROCEDURE — G8420 CALC BMI NORM PARAMETERS: HCPCS | Performed by: INTERNAL MEDICINE

## 2025-03-03 PROCEDURE — 99214 OFFICE O/P EST MOD 30 MIN: CPT | Performed by: INTERNAL MEDICINE

## 2025-03-03 RX ORDER — FLUTICASONE PROPIONATE 50 MCG
2 SPRAY, SUSPENSION (ML) NASAL DAILY
Qty: 3 EACH | Refills: 1 | Status: SHIPPED | OUTPATIENT
Start: 2025-03-03

## 2025-03-03 SDOH — ECONOMIC STABILITY: FOOD INSECURITY: WITHIN THE PAST 12 MONTHS, THE FOOD YOU BOUGHT JUST DIDN'T LAST AND YOU DIDN'T HAVE MONEY TO GET MORE.: NEVER TRUE

## 2025-03-03 SDOH — ECONOMIC STABILITY: FOOD INSECURITY: WITHIN THE PAST 12 MONTHS, YOU WORRIED THAT YOUR FOOD WOULD RUN OUT BEFORE YOU GOT MONEY TO BUY MORE.: NEVER TRUE

## 2025-03-03 ASSESSMENT — PATIENT HEALTH QUESTIONNAIRE - PHQ9
1. LITTLE INTEREST OR PLEASURE IN DOING THINGS: NOT AT ALL
2. FEELING DOWN, DEPRESSED OR HOPELESS: NOT AT ALL
SUM OF ALL RESPONSES TO PHQ QUESTIONS 1-9: 0

## 2025-03-03 ASSESSMENT — ENCOUNTER SYMPTOMS
SHORTNESS OF BREATH: 0
DIARRHEA: 0
VOMITING: 0
NAUSEA: 0
COUGH: 0
ABDOMINAL PAIN: 0

## 2025-03-03 NOTE — PROGRESS NOTES
Chief Complaint   Patient presents with    Hypertension    Follow-up     /60 (Site: Right Upper Arm, Position: Sitting, Cuff Size: Medium Adult)   Pulse 67   Temp 97.9 °F (36.6 °C) (Oral)   Resp 16   Ht 1.664 m (5' 5.5\")   Wt 65.3 kg (144 lb)   BMI 23.60 kg/m²   \"Have you been to the ER, urgent care clinic since your last visit?  Hospitalized since your last visit?\"    YES - When: approximately 02/10/2025 ago.  Where and Why: [facility] for UTI.    “Have you seen or consulted any other health care providers outside our system since your last visit?”    NO

## 2025-03-03 NOTE — PROGRESS NOTES
[facility]  [address] [address]  Phone: [phone]      Liliana Sheikh (: 1940) is a 84 y.o. female, established patient, here for evaluation of the following chief complaint(s):  Hypertension and Follow-up         SUBJECTIVE/OBJECTIVE:  History of Present Illness  The patient is an 83-year-old female with a past medical history of hypertension, thrombocytopenia, sick sinus syndrome status post pacemaker, coronary artery disease, hypercholesterolemia, and prediabetes, here for follow-up.    She recently sought emergency care for a urinary tract infection, which has since resolved. She was prescribed Vantin 200 mg twice daily. She has expressed concern about the potential increased susceptibility to bladder infections with advancing age.    She has been under the care of Dr. Madison and had a recent consultation with Dr. Jackson, a cardiologist, who recommended an echocardiogram and carotid artery evaluation in the coming weeks. She previously consulted with Dr. Stevenson, but due to his correction, she transitioned to Dr. Avalos's care. However, she discontinued a medication prescribed by Dr. Avalos after it caused a significant drop in her blood pressure to 81/43. She has a scheduled appointment with Dr. Avalos in approximately one month, which she intends to cancel. She continues to take carvedilol and amlodipine for her hypertension.    She also reports persistent sinus issues, which she manages with a humidifier and neti pot. She has discontinued the use of Flonase and is currently using a saline spray.    She has experienced a decrease in height, losing approximately 2 inches.    She is on medication for her cholesterol.    She had blood work done at the emergency room and her platelet count was 103. She had another blood work done on 2025 and her platelet count was 88. .    Dr. Jackson advised her to reduce her sugar intake and

## 2025-03-26 LAB — LEFT VENTRICULAR EJECTION FRACTION, EXTERNAL: NORMAL

## 2025-04-24 DIAGNOSIS — J30.89 ENVIRONMENTAL AND SEASONAL ALLERGIES: ICD-10-CM

## 2025-04-24 RX ORDER — CETIRIZINE HYDROCHLORIDE 10 MG/1
10 TABLET ORAL DAILY
Qty: 90 TABLET | Refills: 1 | Status: SHIPPED | OUTPATIENT
Start: 2025-04-24

## 2025-04-24 RX ORDER — NITROGLYCERIN 0.3 MG/1
0.3 TABLET SUBLINGUAL EVERY 5 MIN PRN
Qty: 30 TABLET | Refills: 3 | Status: SHIPPED | OUTPATIENT
Start: 2025-04-24

## 2025-04-24 NOTE — TELEPHONE ENCOUNTER
PCP: Viridiana Puckett MD    Last appt: [unfilled]  Future Appointments   Date Time Provider Department Center   7/2/2025  9:30 AM Viridiana Puckett MD [facility]       Requested Prescriptions     Pending Prescriptions Disp Refills    cetirizine (ZYRTEC) 10 MG tablet 90 tablet 1     Sig: Take 1 tablet by mouth daily    nitroGLYCERIN (NITROSTAT) 0.3 MG SL tablet 30 tablet      Sig: Place 1 tablet under the tongue       Prior labs and Blood pressures:  BP Readings from Last 3 Encounters:   03/03/25 136/60   02/10/25 (!) 155/70   01/20/25 (!) 158/74     Lab Results   Component Value Date/Time     02/10/2025 06:30 PM    K 4.4 02/10/2025 06:30 PM     02/10/2025 06:30 PM    CO2 33 02/10/2025 06:30 PM    BUN 19 02/10/2025 06:30 PM    GFRAA >60 09/02/2022 07:00 PM     No results found for: \"HBA1C\", \"ULZ6MRWP\"  No results found for: \"CHOL\", \"CHOLPOCT\", \"CHLST\", \"CHOLV\", \"HDL\", \"HDLPOC\", \"HDLC\", \"LDL\", \"LDLC\", \"VLDLC\", \"VLDL\"  No results found for: \"VITD3\"    No results found for: \"TSH\", \"TSH2\", \"TSH3\"

## 2025-04-25 ENCOUNTER — TELEPHONE (OUTPATIENT)
Facility: CLINIC | Age: 85
End: 2025-04-25

## 2025-04-25 NOTE — TELEPHONE ENCOUNTER
Nitroglycerin .03 was sent to pharmacy. She normally takes 0.4 Please resend correct medication to [facility]

## 2025-04-28 DIAGNOSIS — I25.118 CORONARY ARTERY DISEASE OF NATIVE ARTERY OF NATIVE HEART WITH STABLE ANGINA PECTORIS: Primary | ICD-10-CM

## 2025-04-28 RX ORDER — NITROGLYCERIN 0.4 MG/1
0.4 TABLET SUBLINGUAL EVERY 5 MIN PRN
Qty: 25 TABLET | Refills: 3 | Status: SHIPPED | OUTPATIENT
Start: 2025-04-28

## 2025-06-06 ENCOUNTER — TELEPHONE (OUTPATIENT)
Facility: CLINIC | Age: 85
End: 2025-06-06

## 2025-06-24 LAB — HBA1C MFR BLD HPLC: 5.9 %

## 2025-07-01 ENCOUNTER — TELEPHONE (OUTPATIENT)
Facility: CLINIC | Age: 85
End: 2025-07-01

## 2025-07-01 SDOH — HEALTH STABILITY: PHYSICAL HEALTH: ON AVERAGE, HOW MANY DAYS PER WEEK DO YOU ENGAGE IN MODERATE TO STRENUOUS EXERCISE (LIKE A BRISK WALK)?: 1 DAY

## 2025-07-01 ASSESSMENT — PATIENT HEALTH QUESTIONNAIRE - PHQ9
SUM OF ALL RESPONSES TO PHQ QUESTIONS 1-9: 0
SUM OF ALL RESPONSES TO PHQ QUESTIONS 1-9: 0
1. LITTLE INTEREST OR PLEASURE IN DOING THINGS: NOT AT ALL
2. FEELING DOWN, DEPRESSED OR HOPELESS: NOT AT ALL
SUM OF ALL RESPONSES TO PHQ QUESTIONS 1-9: 0
SUM OF ALL RESPONSES TO PHQ QUESTIONS 1-9: 0

## 2025-07-01 ASSESSMENT — LIFESTYLE VARIABLES
HOW MANY STANDARD DRINKS CONTAINING ALCOHOL DO YOU HAVE ON A TYPICAL DAY: 0
HOW OFTEN DO YOU HAVE A DRINK CONTAINING ALCOHOL: 1
HOW OFTEN DO YOU HAVE A DRINK CONTAINING ALCOHOL: NEVER
HOW OFTEN DO YOU HAVE SIX OR MORE DRINKS ON ONE OCCASION: 1
HOW MANY STANDARD DRINKS CONTAINING ALCOHOL DO YOU HAVE ON A TYPICAL DAY: PATIENT DOES NOT DRINK

## 2025-07-01 NOTE — TELEPHONE ENCOUNTER
Attempted to contact patient regarding upcoming Medicare wellness appointment and completion of HRA questionnaire. LVM for patient to please return call at [phone].

## 2025-07-02 ENCOUNTER — OFFICE VISIT (OUTPATIENT)
Facility: CLINIC | Age: 85
End: 2025-07-02

## 2025-07-02 VITALS
SYSTOLIC BLOOD PRESSURE: 129 MMHG | DIASTOLIC BLOOD PRESSURE: 59 MMHG | RESPIRATION RATE: 16 BRPM | OXYGEN SATURATION: 98 % | WEIGHT: 143 LBS | BODY MASS INDEX: 23.82 KG/M2 | HEIGHT: 65 IN | TEMPERATURE: 98.3 F | HEART RATE: 60 BPM

## 2025-07-02 DIAGNOSIS — Z95.0 PRESENCE OF CARDIAC PACEMAKER: ICD-10-CM

## 2025-07-02 DIAGNOSIS — A49.9 BACTERIAL URINARY TRACT INFECTION: ICD-10-CM

## 2025-07-02 DIAGNOSIS — N39.0 BACTERIAL URINARY TRACT INFECTION: ICD-10-CM

## 2025-07-02 DIAGNOSIS — Z00.00 MEDICARE ANNUAL WELLNESS VISIT, SUBSEQUENT: Primary | ICD-10-CM

## 2025-07-02 DIAGNOSIS — R73.03 PRE-DIABETES: ICD-10-CM

## 2025-07-02 DIAGNOSIS — E78.00 HYPERCHOLESTEREMIA: ICD-10-CM

## 2025-07-02 DIAGNOSIS — I10 ESSENTIAL HYPERTENSION, BENIGN: ICD-10-CM

## 2025-07-02 DIAGNOSIS — I73.9 PAD (PERIPHERAL ARTERY DISEASE): ICD-10-CM

## 2025-07-02 DIAGNOSIS — I05.0 MODERATE MITRAL STENOSIS: ICD-10-CM

## 2025-07-02 DIAGNOSIS — D69.6 THROMBOCYTOPENIA, UNSPECIFIED: ICD-10-CM

## 2025-07-02 DIAGNOSIS — I65.23 CAROTID STENOSIS, ASYMPTOMATIC, BILATERAL: ICD-10-CM

## 2025-07-02 DIAGNOSIS — I51.7 ENLARGED LA (LEFT ATRIUM): ICD-10-CM

## 2025-07-02 RX ORDER — DOXYCYCLINE HYCLATE 100 MG
100 TABLET ORAL 2 TIMES DAILY
Qty: 14 TABLET | Refills: 0 | Status: SHIPPED | OUTPATIENT
Start: 2025-07-02 | End: 2025-07-09

## 2025-07-02 RX ORDER — ZOSTER VACCINE RECOMBINANT, ADJUVANTED 50 MCG/0.5
0.5 KIT INTRAMUSCULAR SEE ADMIN INSTRUCTIONS
Qty: 0.5 ML | Refills: 0 | Status: SHIPPED | OUTPATIENT
Start: 2025-07-02 | End: 2025-12-29

## 2025-07-02 NOTE — PROGRESS NOTES
Medicare Annual Wellness Visit    Liliana Sheikh is here for Medicare AWV    Assessment & Plan  1. Medicare wellness visit.  - Advise healthy diet and exercise.  Age-appropriate immunizations discussed.  She has not been engaging in regular exercise but has recently started attending senior citizen classes at the [employer] on Thursdays.  - She has an advanced directive in place.  - She is advised to increase physical activity and engage in brain-challenging activities. She is up to date with her DEXA scan, which showed osteopenia last year. She declined a mammogram at this time.  - She is due for Tdap and Shingrix vaccines, which will be administered at her local pharmacy.    2. Hypertension.  - Her blood pressure is well-controlled at 129/59 mmHg.  - Current medication regimen includes carvedilol and amlodipine.  - Blood pressure remains stable   - Continue current medications.    3. Prediabetes.  - A1c is stable at 5.9.  - Blood sugar level is 112.  - Advised to maintain a low-carb diet.    4. Urinary tract infection.  - Urine culture revealed Citrobacter koseri.  - Prescribed doxycycline for 7 days, to be taken twice daily after meals.  - Side effects, including potential nausea and gastritis, were discussed. Advised to avoid excessive sun exposure while on the medication.  - Advised to drink plenty of water and consider cranberry tablets to prevent future infections.    5. Asymptomatic bilateral carotid stenosis.  - Condition remains unchanged with mild stenosis on the right internal carotid and moderate stenosis (50-69%) on the left internal carotid.  - Continue aspirin therapy.    6. Hypercholesterolemia.  - Lipid profile is well-managed with a total cholesterol of 117, HDL of 50, LDL of 40, and triglycerides of 109.  - No adverse effects from current medication.  - Continue taking Lipitor 20 mg daily.  - Regular monitoring of lipid levels.    7. Coronary artery disease.  - Condition is stable without angina.  -

## 2025-07-02 NOTE — PROGRESS NOTES
Chief Complaint   Patient presents with    Medicare AWV     BP (!) 129/59 (BP Site: Right Upper Arm, Patient Position: Sitting, BP Cuff Size: Medium Adult)   Pulse 60   Temp 98.3 °F (36.8 °C) (Oral)   Resp 16   Ht 1.651 m (5' 5\")   Wt 64.9 kg (143 lb)   SpO2 98%   BMI 23.80 kg/m²       Have you been to the ER, urgent care clinic since your last visit?  Hospitalized since your last visit?   NO    Have you seen or consulted any other health care providers outside our system since your last visit?   NO

## 2025-07-02 NOTE — PATIENT INSTRUCTIONS
Learning About Being Active as an Older Adult  Why is being active important as you get older?     Being active is one of the best things you can do for your health. And it's never too late to start. Being active--or getting active, if you aren't already--has definite benefits. It can:  Give you more energy,  Keep your mind sharp.  Improve balance to reduce your risk of falls.  Help you manage chronic illness with fewer medicines.  No matter how old you are, how fit you are, or what health problems you have, there is a form of activity that will work for you. And the more physical activity you can do, the better your overall health will be.  What kinds of activity can help you stay healthy?  Being more active will make your daily activities easier. Physical activity includes planned exercise and things you do in daily life. There are four types of activity:  Aerobic.  Doing aerobic activity makes your heart and lungs strong.  Includes walking, dancing, and gardening.  Aim for at least 2½ hours spread throughout the week.  It improves your energy and can help you sleep better.  Muscle-strengthening.  This type of activity can help maintain muscle and strengthen bones.  Includes climbing stairs, using resistance bands, and lifting or carrying heavy loads.  Aim for at least twice a week.  It can help protect the knees and other joints.  Stretching.  Stretching gives you better range of motion in joints and muscles.  Includes upper arm stretches, calf stretches, and gentle yoga.  Aim for at least twice a week, preferably after your muscles are warmed up from other activities.  It can help you function better in daily life.  Balancing.  This helps you stay coordinated and have good posture.  Includes heel-to-toe walking, herb chi, and certain types of yoga.  Aim for at least 3 days a week.  It can reduce your risk of falling.  Even if you have a hard time meeting the recommendations, it's better to be more active

## 2025-07-03 DIAGNOSIS — R73.03 PRE-DIABETES: ICD-10-CM

## 2025-07-03 DIAGNOSIS — I10 ESSENTIAL HYPERTENSION, BENIGN: ICD-10-CM

## 2025-07-03 DIAGNOSIS — D69.6 THROMBOCYTOPENIA, UNSPECIFIED: ICD-10-CM

## 2025-07-03 DIAGNOSIS — E78.00 HYPERCHOLESTEREMIA: ICD-10-CM
